# Patient Record
Sex: MALE | Race: BLACK OR AFRICAN AMERICAN | NOT HISPANIC OR LATINO | URBAN - METROPOLITAN AREA
[De-identification: names, ages, dates, MRNs, and addresses within clinical notes are randomized per-mention and may not be internally consistent; named-entity substitution may affect disease eponyms.]

---

## 2023-03-15 ENCOUNTER — INPATIENT (INPATIENT)
Facility: HOSPITAL | Age: 33
LOS: 4 days | Discharge: HOME HEALTH | End: 2023-03-20
Attending: INTERNAL MEDICINE | Admitting: INTERNAL MEDICINE
Payer: COMMERCIAL

## 2023-03-15 VITALS
WEIGHT: 250 LBS | TEMPERATURE: 98 F | DIASTOLIC BLOOD PRESSURE: 96 MMHG | RESPIRATION RATE: 20 BRPM | OXYGEN SATURATION: 96 % | HEIGHT: 71 IN | SYSTOLIC BLOOD PRESSURE: 134 MMHG | HEART RATE: 123 BPM

## 2023-03-15 DIAGNOSIS — E10.10 TYPE 1 DIABETES MELLITUS WITH KETOACIDOSIS WITHOUT COMA: ICD-10-CM

## 2023-03-15 LAB
ALBUMIN SERPL ELPH-MCNC: 3.7 G/DL — SIGNIFICANT CHANGE UP (ref 3.3–5)
ALBUMIN SERPL ELPH-MCNC: 3.9 G/DL — SIGNIFICANT CHANGE UP (ref 3.3–5)
ALBUMIN SERPL ELPH-MCNC: 4.8 G/DL — SIGNIFICANT CHANGE UP (ref 3.3–5)
ALP SERPL-CCNC: 111 U/L — SIGNIFICANT CHANGE UP (ref 40–120)
ALP SERPL-CCNC: 80 U/L — SIGNIFICANT CHANGE UP (ref 40–120)
ALP SERPL-CCNC: 89 U/L — SIGNIFICANT CHANGE UP (ref 40–120)
ALT FLD-CCNC: 47 U/L — SIGNIFICANT CHANGE UP (ref 12–78)
ALT FLD-CCNC: 57 U/L — SIGNIFICANT CHANGE UP (ref 12–78)
ALT FLD-CCNC: 68 U/L — SIGNIFICANT CHANGE UP (ref 12–78)
ANION GAP SERPL CALC-SCNC: 20 MMOL/L — HIGH (ref 5–17)
ANION GAP SERPL CALC-SCNC: 25 MMOL/L — HIGH (ref 5–17)
ANION GAP SERPL CALC-SCNC: 9 MMOL/L — SIGNIFICANT CHANGE UP (ref 5–17)
APPEARANCE UR: CLEAR — SIGNIFICANT CHANGE UP
APTT BLD: 29.5 SEC — SIGNIFICANT CHANGE UP (ref 27.5–35.5)
AST SERPL-CCNC: 11 U/L — LOW (ref 15–37)
AST SERPL-CCNC: 15 U/L — SIGNIFICANT CHANGE UP (ref 15–37)
AST SERPL-CCNC: 19 U/L — SIGNIFICANT CHANGE UP (ref 15–37)
BACTERIA # UR AUTO: ABNORMAL
BASE EXCESS BLDV CALC-SCNC: -15.5 MMOL/L — LOW (ref -2–3)
BASOPHILS # BLD AUTO: 0.03 K/UL — SIGNIFICANT CHANGE UP (ref 0–0.2)
BASOPHILS # BLD AUTO: 0.04 K/UL — SIGNIFICANT CHANGE UP (ref 0–0.2)
BASOPHILS NFR BLD AUTO: 0.3 % — SIGNIFICANT CHANGE UP (ref 0–2)
BASOPHILS NFR BLD AUTO: 0.4 % — SIGNIFICANT CHANGE UP (ref 0–2)
BILIRUB SERPL-MCNC: 0.5 MG/DL — SIGNIFICANT CHANGE UP (ref 0.2–1.2)
BILIRUB SERPL-MCNC: 0.6 MG/DL — SIGNIFICANT CHANGE UP (ref 0.2–1.2)
BILIRUB SERPL-MCNC: 0.6 MG/DL — SIGNIFICANT CHANGE UP (ref 0.2–1.2)
BILIRUB UR-MCNC: NEGATIVE — SIGNIFICANT CHANGE UP
BLOOD GAS COMMENTS, VENOUS: SIGNIFICANT CHANGE UP
BUN SERPL-MCNC: 18 MG/DL — SIGNIFICANT CHANGE UP (ref 7–23)
BUN SERPL-MCNC: 21 MG/DL — SIGNIFICANT CHANGE UP (ref 7–23)
BUN SERPL-MCNC: 24 MG/DL — HIGH (ref 7–23)
CALCIUM SERPL-MCNC: 9.1 MG/DL — SIGNIFICANT CHANGE UP (ref 8.5–10.1)
CALCIUM SERPL-MCNC: 9.2 MG/DL — SIGNIFICANT CHANGE UP (ref 8.5–10.1)
CALCIUM SERPL-MCNC: 9.8 MG/DL — SIGNIFICANT CHANGE UP (ref 8.5–10.1)
CHLORIDE BLDV-SCNC: 106 MMOL/L — SIGNIFICANT CHANGE UP (ref 98–107)
CHLORIDE SERPL-SCNC: 100 MMOL/L — SIGNIFICANT CHANGE UP (ref 96–108)
CHLORIDE SERPL-SCNC: 109 MMOL/L — HIGH (ref 96–108)
CHLORIDE SERPL-SCNC: 114 MMOL/L — HIGH (ref 96–108)
CO2 BLDV-SCNC: 12 MMOL/L — LOW (ref 22–26)
CO2 SERPL-SCNC: 11 MMOL/L — LOW (ref 22–31)
CO2 SERPL-SCNC: 19 MMOL/L — LOW (ref 22–31)
CO2 SERPL-SCNC: 9 MMOL/L — CRITICAL LOW (ref 22–31)
COLOR SPEC: YELLOW — SIGNIFICANT CHANGE UP
CREAT SERPL-MCNC: 1.56 MG/DL — HIGH (ref 0.5–1.3)
CREAT SERPL-MCNC: 1.77 MG/DL — HIGH (ref 0.5–1.3)
CREAT SERPL-MCNC: 2.11 MG/DL — HIGH (ref 0.5–1.3)
D DIMER BLD IA.RAPID-MCNC: 177 NG/ML DDU — SIGNIFICANT CHANGE UP
DIFF PNL FLD: ABNORMAL
EGFR: 42 ML/MIN/1.73M2 — LOW
EGFR: 52 ML/MIN/1.73M2 — LOW
EGFR: 61 ML/MIN/1.73M2 — SIGNIFICANT CHANGE UP
EOSINOPHIL # BLD AUTO: 0 K/UL — SIGNIFICANT CHANGE UP (ref 0–0.5)
EOSINOPHIL # BLD AUTO: 0 K/UL — SIGNIFICANT CHANGE UP (ref 0–0.5)
EOSINOPHIL NFR BLD AUTO: 0 % — SIGNIFICANT CHANGE UP (ref 0–6)
EOSINOPHIL NFR BLD AUTO: 0 % — SIGNIFICANT CHANGE UP (ref 0–6)
EPI CELLS # UR: SIGNIFICANT CHANGE UP
FLUAV AG NPH QL: SIGNIFICANT CHANGE UP
FLUBV AG NPH QL: SIGNIFICANT CHANGE UP
GAS PNL BLDA: SIGNIFICANT CHANGE UP
GAS PNL BLDV: 140 MMOL/L — SIGNIFICANT CHANGE UP (ref 136–145)
GAS PNL BLDV: SIGNIFICANT CHANGE UP
GAS PNL BLDV: SIGNIFICANT CHANGE UP
GLUCOSE BLDC GLUCOMTR-MCNC: 208 MG/DL — HIGH (ref 70–99)
GLUCOSE BLDC GLUCOMTR-MCNC: 225 MG/DL — HIGH (ref 70–99)
GLUCOSE BLDC GLUCOMTR-MCNC: 229 MG/DL — HIGH (ref 70–99)
GLUCOSE BLDC GLUCOMTR-MCNC: 243 MG/DL — HIGH (ref 70–99)
GLUCOSE BLDC GLUCOMTR-MCNC: 255 MG/DL — HIGH (ref 70–99)
GLUCOSE BLDC GLUCOMTR-MCNC: 273 MG/DL — HIGH (ref 70–99)
GLUCOSE BLDC GLUCOMTR-MCNC: 273 MG/DL — HIGH (ref 70–99)
GLUCOSE BLDC GLUCOMTR-MCNC: 334 MG/DL — HIGH (ref 70–99)
GLUCOSE BLDC GLUCOMTR-MCNC: 363 MG/DL — HIGH (ref 70–99)
GLUCOSE BLDC GLUCOMTR-MCNC: 509 MG/DL — CRITICAL HIGH (ref 70–99)
GLUCOSE BLDC GLUCOMTR-MCNC: 542 MG/DL — CRITICAL HIGH (ref 70–99)
GLUCOSE BLDC GLUCOMTR-MCNC: >600 MG/DL — CRITICAL HIGH (ref 70–99)
GLUCOSE BLDV-MCNC: 445 MG/DL — HIGH (ref 65–95)
GLUCOSE SERPL-MCNC: 269 MG/DL — HIGH (ref 70–99)
GLUCOSE SERPL-MCNC: 387 MG/DL — HIGH (ref 70–99)
GLUCOSE SERPL-MCNC: 759 MG/DL — CRITICAL HIGH (ref 70–99)
GLUCOSE UR QL: 1000 MG/DL
HCO3 BLDV-SCNC: 11 MMOL/L — LOW (ref 22–28)
HCT VFR BLD CALC: 43.5 % — SIGNIFICANT CHANGE UP (ref 39–50)
HCT VFR BLD CALC: 48.7 % — SIGNIFICANT CHANGE UP (ref 39–50)
HCT VFR BLD CALC: 52.2 % — HIGH (ref 39–50)
HCT VFR BLDA CALC: 52 % — HIGH (ref 37–47)
HGB BLD CALC-MCNC: 17.2 G/DL — SIGNIFICANT CHANGE UP (ref 12.6–17.4)
HGB BLD-MCNC: 14.9 G/DL — SIGNIFICANT CHANGE UP (ref 13–17)
HGB BLD-MCNC: 15.8 G/DL — SIGNIFICANT CHANGE UP (ref 13–17)
HGB BLD-MCNC: 17.4 G/DL — HIGH (ref 13–17)
HOROWITZ INDEX BLDV+IHG-RTO: 21 — SIGNIFICANT CHANGE UP
IMM GRANULOCYTES NFR BLD AUTO: 0.3 % — SIGNIFICANT CHANGE UP (ref 0–0.9)
IMM GRANULOCYTES NFR BLD AUTO: 0.4 % — SIGNIFICANT CHANGE UP (ref 0–0.9)
INR BLD: 0.96 RATIO — SIGNIFICANT CHANGE UP (ref 0.88–1.16)
KETONES UR-MCNC: ABNORMAL
LACTATE BLDV-MCNC: 3.5 MMOL/L — HIGH (ref 0.56–1.39)
LACTATE SERPL-SCNC: 1.7 MMOL/L — SIGNIFICANT CHANGE UP (ref 0.7–2)
LACTATE SERPL-SCNC: 2.4 MMOL/L — HIGH (ref 0.7–2)
LEUKOCYTE ESTERASE UR-ACNC: NEGATIVE — SIGNIFICANT CHANGE UP
LIDOCAIN IGE QN: 557 U/L — HIGH (ref 73–393)
LYMPHOCYTES # BLD AUTO: 1.96 K/UL — SIGNIFICANT CHANGE UP (ref 1–3.3)
LYMPHOCYTES # BLD AUTO: 21.8 % — SIGNIFICANT CHANGE UP (ref 13–44)
LYMPHOCYTES # BLD AUTO: 29.9 % — SIGNIFICANT CHANGE UP (ref 13–44)
LYMPHOCYTES # BLD AUTO: 3.35 K/UL — HIGH (ref 1–3.3)
MAGNESIUM SERPL-MCNC: 2.6 MG/DL — SIGNIFICANT CHANGE UP (ref 1.6–2.6)
MAGNESIUM SERPL-MCNC: 2.7 MG/DL — HIGH (ref 1.6–2.6)
MAGNESIUM SERPL-MCNC: 2.9 MG/DL — HIGH (ref 1.6–2.6)
MCHC RBC-ENTMCNC: 27.9 PG — SIGNIFICANT CHANGE UP (ref 27–34)
MCHC RBC-ENTMCNC: 28.2 PG — SIGNIFICANT CHANGE UP (ref 27–34)
MCHC RBC-ENTMCNC: 28.3 PG — SIGNIFICANT CHANGE UP (ref 27–34)
MCHC RBC-ENTMCNC: 32.4 G/DL — SIGNIFICANT CHANGE UP (ref 32–36)
MCHC RBC-ENTMCNC: 33.3 G/DL — SIGNIFICANT CHANGE UP (ref 32–36)
MCHC RBC-ENTMCNC: 34.3 G/DL — SIGNIFICANT CHANGE UP (ref 32–36)
MCV RBC AUTO: 82.5 FL — SIGNIFICANT CHANGE UP (ref 80–100)
MCV RBC AUTO: 84.5 FL — SIGNIFICANT CHANGE UP (ref 80–100)
MCV RBC AUTO: 85.9 FL — SIGNIFICANT CHANGE UP (ref 80–100)
MONOCYTES # BLD AUTO: 0.77 K/UL — SIGNIFICANT CHANGE UP (ref 0–0.9)
MONOCYTES # BLD AUTO: 1.17 K/UL — HIGH (ref 0–0.9)
MONOCYTES NFR BLD AUTO: 10.4 % — SIGNIFICANT CHANGE UP (ref 2–14)
MONOCYTES NFR BLD AUTO: 8.6 % — SIGNIFICANT CHANGE UP (ref 2–14)
NEUTROPHILS # BLD AUTO: 6.2 K/UL — SIGNIFICANT CHANGE UP (ref 1.8–7.4)
NEUTROPHILS # BLD AUTO: 6.6 K/UL — SIGNIFICANT CHANGE UP (ref 1.8–7.4)
NEUTROPHILS NFR BLD AUTO: 58.9 % — SIGNIFICANT CHANGE UP (ref 43–77)
NEUTROPHILS NFR BLD AUTO: 69 % — SIGNIFICANT CHANGE UP (ref 43–77)
NITRITE UR-MCNC: NEGATIVE — SIGNIFICANT CHANGE UP
NRBC # BLD: 0 /100 WBCS — SIGNIFICANT CHANGE UP (ref 0–0)
PCO2 BLDV: 28 MMHG — LOW (ref 42–55)
PH BLDV: 7.2 — LOW (ref 7.32–7.43)
PH UR: 6 — SIGNIFICANT CHANGE UP (ref 5–8)
PHOSPHATE SERPL-MCNC: 1.6 MG/DL — LOW (ref 2.5–4.5)
PHOSPHATE SERPL-MCNC: 2.2 MG/DL — LOW (ref 2.5–4.5)
PLATELET # BLD AUTO: 309 K/UL — SIGNIFICANT CHANGE UP (ref 150–400)
PLATELET # BLD AUTO: 333 K/UL — SIGNIFICANT CHANGE UP (ref 150–400)
PLATELET # BLD AUTO: 370 K/UL — SIGNIFICANT CHANGE UP (ref 150–400)
PO2 BLDV: 40 MMHG — SIGNIFICANT CHANGE UP (ref 25–45)
POTASSIUM BLDV-SCNC: 4.5 MMOL/L — SIGNIFICANT CHANGE UP (ref 3.5–5.1)
POTASSIUM SERPL-MCNC: 4 MMOL/L — SIGNIFICANT CHANGE UP (ref 3.5–5.3)
POTASSIUM SERPL-MCNC: 4.4 MMOL/L — SIGNIFICANT CHANGE UP (ref 3.5–5.3)
POTASSIUM SERPL-MCNC: 5.4 MMOL/L — HIGH (ref 3.5–5.3)
POTASSIUM SERPL-SCNC: 4 MMOL/L — SIGNIFICANT CHANGE UP (ref 3.5–5.3)
POTASSIUM SERPL-SCNC: 4.4 MMOL/L — SIGNIFICANT CHANGE UP (ref 3.5–5.3)
POTASSIUM SERPL-SCNC: 5.4 MMOL/L — HIGH (ref 3.5–5.3)
PROT SERPL-MCNC: 7.4 GM/DL — SIGNIFICANT CHANGE UP (ref 6–8.3)
PROT SERPL-MCNC: 8 GM/DL — SIGNIFICANT CHANGE UP (ref 6–8.3)
PROT SERPL-MCNC: 9.5 GM/DL — HIGH (ref 6–8.3)
PROT UR-MCNC: 100 MG/DL
PROTHROM AB SERPL-ACNC: 11.5 SEC — SIGNIFICANT CHANGE UP (ref 10.5–13.4)
RAPID RVP RESULT: SIGNIFICANT CHANGE UP
RBC # BLD: 5.27 M/UL — SIGNIFICANT CHANGE UP (ref 4.2–5.8)
RBC # BLD: 5.67 M/UL — SIGNIFICANT CHANGE UP (ref 4.2–5.8)
RBC # BLD: 6.18 M/UL — HIGH (ref 4.2–5.8)
RBC # FLD: 13.1 % — SIGNIFICANT CHANGE UP (ref 10.3–14.5)
RBC # FLD: 13.1 % — SIGNIFICANT CHANGE UP (ref 10.3–14.5)
RBC # FLD: 13.2 % — SIGNIFICANT CHANGE UP (ref 10.3–14.5)
RBC CASTS # UR COMP ASSIST: ABNORMAL /HPF (ref 0–4)
SAO2 % BLDV: 65.8 % — LOW (ref 94–98)
SARS-COV-2 RNA SPEC QL NAA+PROBE: SIGNIFICANT CHANGE UP
SARS-COV-2 RNA SPEC QL NAA+PROBE: SIGNIFICANT CHANGE UP
SODIUM SERPL-SCNC: 134 MMOL/L — LOW (ref 135–145)
SODIUM SERPL-SCNC: 140 MMOL/L — SIGNIFICANT CHANGE UP (ref 135–145)
SODIUM SERPL-SCNC: 142 MMOL/L — SIGNIFICANT CHANGE UP (ref 135–145)
SP GR SPEC: 1.01 — SIGNIFICANT CHANGE UP (ref 1.01–1.02)
TSH SERPL-MCNC: 1.12 UIU/ML — SIGNIFICANT CHANGE UP (ref 0.36–3.74)
UROBILINOGEN FLD QL: NEGATIVE MG/DL — SIGNIFICANT CHANGE UP
WBC # BLD: 11.02 K/UL — HIGH (ref 3.8–10.5)
WBC # BLD: 11.21 K/UL — HIGH (ref 3.8–10.5)
WBC # BLD: 8.99 K/UL — SIGNIFICANT CHANGE UP (ref 3.8–10.5)
WBC # FLD AUTO: 11.02 K/UL — HIGH (ref 3.8–10.5)
WBC # FLD AUTO: 11.21 K/UL — HIGH (ref 3.8–10.5)
WBC # FLD AUTO: 8.99 K/UL — SIGNIFICANT CHANGE UP (ref 3.8–10.5)
WBC UR QL: SIGNIFICANT CHANGE UP

## 2023-03-15 PROCEDURE — 93010 ELECTROCARDIOGRAM REPORT: CPT

## 2023-03-15 PROCEDURE — 99285 EMERGENCY DEPT VISIT HI MDM: CPT

## 2023-03-15 PROCEDURE — 99291 CRITICAL CARE FIRST HOUR: CPT

## 2023-03-15 PROCEDURE — 71046 X-RAY EXAM CHEST 2 VIEWS: CPT | Mod: 26

## 2023-03-15 RX ORDER — SODIUM CHLORIDE 9 MG/ML
1000 INJECTION, SOLUTION INTRAVENOUS ONCE
Refills: 0 | Status: COMPLETED | OUTPATIENT
Start: 2023-03-15 | End: 2023-03-15

## 2023-03-15 RX ORDER — INSULIN HUMAN 100 [IU]/ML
10 INJECTION, SOLUTION SUBCUTANEOUS
Qty: 100 | Refills: 0 | Status: DISCONTINUED | OUTPATIENT
Start: 2023-03-15 | End: 2023-03-16

## 2023-03-15 RX ORDER — SODIUM CHLORIDE 9 MG/ML
1000 INJECTION, SOLUTION INTRAVENOUS
Refills: 0 | Status: DISCONTINUED | OUTPATIENT
Start: 2023-03-15 | End: 2023-03-16

## 2023-03-15 RX ORDER — SODIUM,POTASSIUM PHOSPHATES 278-250MG
1 POWDER IN PACKET (EA) ORAL ONCE
Refills: 0 | Status: COMPLETED | OUTPATIENT
Start: 2023-03-15 | End: 2023-03-15

## 2023-03-15 RX ORDER — POTASSIUM PHOSPHATE, MONOBASIC POTASSIUM PHOSPHATE, DIBASIC 236; 224 MG/ML; MG/ML
15 INJECTION, SOLUTION INTRAVENOUS ONCE
Refills: 0 | Status: COMPLETED | OUTPATIENT
Start: 2023-03-15 | End: 2023-03-15

## 2023-03-15 RX ORDER — HEPARIN SODIUM 5000 [USP'U]/ML
5000 INJECTION INTRAVENOUS; SUBCUTANEOUS EVERY 8 HOURS
Refills: 0 | Status: DISCONTINUED | OUTPATIENT
Start: 2023-03-15 | End: 2023-03-17

## 2023-03-15 RX ORDER — ATORVASTATIN CALCIUM 80 MG/1
20 TABLET, FILM COATED ORAL AT BEDTIME
Refills: 0 | Status: DISCONTINUED | OUTPATIENT
Start: 2023-03-15 | End: 2023-03-20

## 2023-03-15 RX ORDER — ONDANSETRON 8 MG/1
4 TABLET, FILM COATED ORAL EVERY 6 HOURS
Refills: 0 | Status: DISCONTINUED | OUTPATIENT
Start: 2023-03-15 | End: 2023-03-20

## 2023-03-15 RX ORDER — ONDANSETRON 8 MG/1
4 TABLET, FILM COATED ORAL ONCE
Refills: 0 | Status: COMPLETED | OUTPATIENT
Start: 2023-03-15 | End: 2023-03-15

## 2023-03-15 RX ORDER — SODIUM CHLORIDE 9 MG/ML
1000 INJECTION INTRAMUSCULAR; INTRAVENOUS; SUBCUTANEOUS ONCE
Refills: 0 | Status: DISCONTINUED | OUTPATIENT
Start: 2023-03-15 | End: 2023-03-15

## 2023-03-15 RX ORDER — POTASSIUM PHOSPHATE, MONOBASIC POTASSIUM PHOSPHATE, DIBASIC 236; 224 MG/ML; MG/ML
15 INJECTION, SOLUTION INTRAVENOUS ONCE
Refills: 0 | Status: DISCONTINUED | OUTPATIENT
Start: 2023-03-15 | End: 2023-03-15

## 2023-03-15 RX ORDER — SODIUM CHLORIDE 9 MG/ML
1000 INJECTION, SOLUTION INTRAVENOUS
Refills: 0 | Status: DISCONTINUED | OUTPATIENT
Start: 2023-03-15 | End: 2023-03-15

## 2023-03-15 RX ORDER — INSULIN HUMAN 100 [IU]/ML
10 INJECTION, SOLUTION SUBCUTANEOUS ONCE
Refills: 0 | Status: COMPLETED | OUTPATIENT
Start: 2023-03-15 | End: 2023-03-15

## 2023-03-15 RX ORDER — CHLORHEXIDINE GLUCONATE 213 G/1000ML
1 SOLUTION TOPICAL
Refills: 0 | Status: DISCONTINUED | OUTPATIENT
Start: 2023-03-15 | End: 2023-03-17

## 2023-03-15 RX ADMIN — INSULIN HUMAN 10 UNIT(S): 100 INJECTION, SOLUTION SUBCUTANEOUS at 12:26

## 2023-03-15 RX ADMIN — SODIUM CHLORIDE 1000 MILLILITER(S): 9 INJECTION, SOLUTION INTRAVENOUS at 13:45

## 2023-03-15 RX ADMIN — INSULIN HUMAN 10 UNIT(S)/HR: 100 INJECTION, SOLUTION SUBCUTANEOUS at 14:22

## 2023-03-15 RX ADMIN — SODIUM CHLORIDE 1000 MILLILITER(S): 9 INJECTION, SOLUTION INTRAVENOUS at 13:44

## 2023-03-15 RX ADMIN — SODIUM CHLORIDE 150 MILLILITER(S): 9 INJECTION, SOLUTION INTRAVENOUS at 20:09

## 2023-03-15 RX ADMIN — INSULIN HUMAN 10 UNIT(S)/HR: 100 INJECTION, SOLUTION SUBCUTANEOUS at 20:04

## 2023-03-15 RX ADMIN — POTASSIUM PHOSPHATE, MONOBASIC POTASSIUM PHOSPHATE, DIBASIC 62.5 MILLIMOLE(S): 236; 224 INJECTION, SOLUTION INTRAVENOUS at 21:30

## 2023-03-15 RX ADMIN — ATORVASTATIN CALCIUM 20 MILLIGRAM(S): 80 TABLET, FILM COATED ORAL at 21:28

## 2023-03-15 RX ADMIN — SODIUM CHLORIDE 1000 MILLILITER(S): 9 INJECTION, SOLUTION INTRAVENOUS at 14:22

## 2023-03-15 RX ADMIN — SODIUM CHLORIDE 1000 MILLILITER(S): 9 INJECTION, SOLUTION INTRAVENOUS at 12:13

## 2023-03-15 RX ADMIN — SODIUM CHLORIDE 125 MILLILITER(S): 9 INJECTION, SOLUTION INTRAVENOUS at 15:14

## 2023-03-15 RX ADMIN — CHLORHEXIDINE GLUCONATE 1 APPLICATION(S): 213 SOLUTION TOPICAL at 14:50

## 2023-03-15 RX ADMIN — ONDANSETRON 4 MILLIGRAM(S): 8 TABLET, FILM COATED ORAL at 12:32

## 2023-03-15 RX ADMIN — HEPARIN SODIUM 5000 UNIT(S): 5000 INJECTION INTRAVENOUS; SUBCUTANEOUS at 21:28

## 2023-03-15 RX ADMIN — Medication 1 PACKET(S): at 23:57

## 2023-03-15 RX ADMIN — SODIUM CHLORIDE 1000 MILLILITER(S): 9 INJECTION, SOLUTION INTRAVENOUS at 16:07

## 2023-03-15 NOTE — ED ADULT NURSE REASSESSMENT NOTE - NS ED NURSE REASSESS COMMENT FT1
unable to gain IV access at this time, several attempts made to gain IV access with no success, MD made aware

## 2023-03-15 NOTE — ED PROVIDER NOTE - NS ED ROS FT
CONSTITUTIONAL: No fever, no chills.   EYES: No visual changes  ENT: No ear pain, no sore throat  CARDIOVASCULAR: No chest pain, no palpitations  RESPIRATORY: No cough.   GI:  no constipation, no diarrhea  GENITOURINARY: No dysuria, no frequency, no hematuria  MUSKULOSKELETAL: No backpain, no joint pain, no myalgias  SKIN: No rash  NEURO: No headache    ALL OTHER SYSTEMS NEGATIVE.

## 2023-03-15 NOTE — PROGRESS NOTE ADULT - ASSESSMENT
Pt is a 33 year old male with a pmhx of DM (on metformin), HLD presented with feeling fatigue, frequent urination, abdominal pain N/V, and difficulty breathing progressively getting worse for 1 month, found to have:    1. DKA  2. ARF       Plan  Maintain insulin infusion overnight currently at 4 units/hr. Serum bicarb still low (19) but improving. AG closed. Will await for AG closed x 2 before transition. q1hr accu-check. Start d5 + LR and increase to 150cc/hr given dehydration. q labs with lyts. Will give potassium phos and Po phos for lyts supplementation. transition to lantus when able. Endocrine consult.  NPO except ice chips and meds   monitor off abx, blood cx pending  SCDs + heparin for dvt ppx  Pt is a 33 year old male with a pmhx of DM (on metformin), HLD presented with feeling fatigue, frequent urination, abdominal pain N/V, and difficulty breathing progressively getting worse for 1 month, found to have:    1. DKA  2. ARF       Plan  Maintain insulin infusion overnight currently at 4 units/hr. Serum bicarb still low (19) but improving. AG closed. Will await for AG closed x 2 before transition to lantus. maintain q1hr accu-check. Start d5 + LR and increase to 150cc/hr given dehydration. q labs with lyts. Will give potassium phos and Po phos for lyts supplementation. transition to lantus when able. Endocrine consult.  NPO except ice chips and meds   monitor off abx, blood cx pending  SCDs + heparin for dvt ppx   zofran PRN  Remains in ICU   Case d/w Dr. Perera

## 2023-03-15 NOTE — H&P ADULT - NSHPPHYSICALEXAM_GEN_ALL_CORE
General: No acute distress.  Comfortable in bed  NEURO: A0x 4   HEENT: Pupils equal and symmetrically reactive to light. Dry mucous membranes   PULM: Clear to auscultation bilaterally.  CVS: Regular rhythm, no murmurs, rubs, or gallops. + tachycardia   ABD: Obese, soft, nondistended, no masses. Mild tenderness   EXT: No edema.  SKIN: Warm and well perfused, no rashes.

## 2023-03-15 NOTE — ED PROVIDER NOTE - ATTENDING APP SHARED VISIT CONTRIBUTION OF CARE
I have personally seen and examined this pt I have fully participated in the care of this pt I have made amendments to the documentation where appropriate and otherwise hx, exam and plan as documented by the ACP. Pt sts he has a hx of diabetes and takes Metformin 500 mg qd but did not take it for 1 to 2 weeks now pt is speaking in clear full sentences no nasal flaring no shoulders retractions appears very comfortable breath sounds are clear equal bilaterally abd is soft nontender to palp x 4 no cva tenderness.

## 2023-03-15 NOTE — ED PROVIDER NOTE - CLINICAL SUMMARY MEDICAL DECISION MAKING FREE TEXT BOX
32 y/o male with DM, HLD here with fatigue, frequent urination, right sided flank pain, vomiting x 1 month. Vs reviewed mildly tachycardic.   Will check labs including d-dimer,  UA r/o UTI, ekg, cxr, ivf. 32 y/o male with DM, HLD here with fatigue, frequent urination, right sided flank pain, vomiting x 1 month. Vs reviewed mildly tachycardic.   Will check labs including d-dimer,  UA r/o UTI, ekg, cxr, ivf.    labs reviewed and glucose >700s Anion gap 25. ph 7.23. IVF ordered, insulin 10 units IV ordered. D-dimer negative. cxr negative.   ICU consulted for DKA.   Discussed with ICU will admit to ICU under Dr Pettit

## 2023-03-15 NOTE — CONSULT NOTE ADULT - SUBJECTIVE AND OBJECTIVE BOX
Patient is a 33y old  Male who presents with a chief complaint of Nausea/Vomiting/Poor PO intake (15 Mar 2023 15:30)      Reason For Consult: Diabetic Ketoacidosis     HPI:  32 y/o male with DM (on metformin), HLD presented with feeling fatigue, frequent urination, abdominal pain N/V, and difficulty breathing progressively getting worse for 1 month. Pt states unable to tolerate PO with vomiting. Denies fever, chills, chest pain, diarrhea, hematuria. Denies recent travel (last travel to [facility] Sept), no sick contact. Pt reports not being able to take his DM medications for the past week. In ER labs notable for glucose 759, anion gap 25, bicarb 9, TASIA Cr 2.11, pH 7.23. (15 Mar 2023 15:30)    currently on IV insulin drip Diabetic Ketoacidosis resolved to a great extent with finger sticks in mid 200   switch made to Dextrose 5% water   PAST MEDICAL & SURGICAL HISTORY:  High cholesterol      Diabetes mellitus          FAMILY HISTORY:        Social History:    MEDICATIONS  (STANDING):  atorvastatin 20 milliGRAM(s) Oral at bedtime  chlorhexidine 2% Cloths 1 Application(s) Topical <User Schedule>  dextrose 5% + lactated ringers. 1000 milliLiter(s) (150 mL/Hr) IV Continuous <Continuous>  heparin   Injectable 5000 Unit(s) SubCutaneous every 8 hours  insulin regular Infusion 10 Unit(s)/Hr (10 mL/Hr) IV Continuous <Continuous>    MEDICATIONS  (PRN):  ondansetron Injectable 4 milliGRAM(s) IV Push every 6 hours PRN Nausea and/or Vomiting      REVIEW OF SYSTEMS:  CONSTITUTIONAL:  as per HPI      T(C): 35.9 (03-15-23 @ 14:50), Max: 36.6 (03-15-23 @ 08:30)  HR: 106 (03-15-23 @ 21:00) (106 - 123)  BP: 137/80 (03-15-23 @ 21:00) (122/83 - 158/89)  RR: 12 (03-15-23 @ 21:00) (10 - 26)  SpO2: 97% (03-15-23 @ 21:00) (95% - 100%)  Wt(kg): --    PHYSICAL EXAM:  GENERAL: NAD, well-groomed, well-developed  HEAD:  Atraumatic, Normocephalic  EYES: PERRLA, conjunctiva and sclera clear  ENMT: No  exudates,, Moist mucous membranes,, No lesions  NECK: Supple, No JVD,   NERVOUS SYSTEM:  Alert & Oriented   CHEST/LUNG: Clear to percussion bilaterally; No rales, rhonchi, wheezing, or rubs  HEART: Regular rate and rhythm; No murmurs, rubs, or gallops  ABDOMEN: Soft, Nontender, Nondistended; Bowel sounds present  EXTREMITIES:  2+ Peripheral Pulses, No clubbing, cyanosis, or edema  LYMPH: No lymphadenopathy noted  SKIN: No rashes or lesions    CAPILLARY BLOOD GLUCOSE      POCT Blood Glucose.: 208 mg/dL (15 Mar 2023 22:02)  POCT Blood Glucose.: 229 mg/dL (15 Mar 2023 20:56)  POCT Blood Glucose.: 243 mg/dL (15 Mar 2023 20:02)  POCT Blood Glucose.: 273 mg/dL (15 Mar 2023 19:00)  POCT Blood Glucose.: 273 mg/dL (15 Mar 2023 18:05)  POCT Blood Glucose.: 363 mg/dL (15 Mar 2023 17:07)  POCT Blood Glucose.: 334 mg/dL (15 Mar 2023 16:02)  POCT Blood Glucose.: 542 mg/dL (15 Mar 2023 15:04)  POCT Blood Glucose.: 509 mg/dL (15 Mar 2023 13:53)  POCT Blood Glucose.: >600 mg/dL (15 Mar 2023 11:44)                            15.8   11.21 )-----------( 333      ( 15 Mar 2023 16:36 )             48.7       CMP:  03-15 @ 16:36  SGPT 57  Albumin 3.9   Alk Phos 89   Anion Gap 20   SGOT 15   Total Bili 0.5   BUN 21   Calcium Total 9.1   CO2 11   Chloride 109   Creatinine 1.77   eGFR if AA --   eGFR if non AA --   Glucose 387   Potassium 4.4   Protein 8.0   Sodium 140      Thyroid Function Tests:  03-15 @ 16:36 TSH 1.120 FreeT4 -- T3 -- Anti TPO -- Anti Thyroglobulin Ab -- TSI --      Diabetes Tests:     Parathyroids:     Adrenals:       Radiology:

## 2023-03-15 NOTE — H&P ADULT - CRITICAL CARE ATTENDING COMMENT
34 y/o M w/T2DM presenting with DKA.    - IV fluids  - Insulin gtt  - Endo consult  - DVT prophylaxis 32 y/o M w/T2DM presenting with DKA. TASIA likely prerenal.    - IV fluids  - Insulin gtt  - Trend Cr, avoid nephrotoxins  - Endo consult  - DVT prophylaxis

## 2023-03-15 NOTE — H&P ADULT - NSHPLABSRESULTS_GEN_ALL_CORE
ICU Vital Signs Last 24 Hrs  T(C): 35.9 (15 Mar 2023 14:50), Max: 36.6 (15 Mar 2023 08:30)  T(F): 96.6 (15 Mar 2023 14:50), Max: 97.8 (15 Mar 2023 08:30)  HR: 118 (15 Mar 2023 15:30) (114 - 123)  BP: 148/97 (15 Mar 2023 15:00) (134/96 - 153/98)  BP(mean): 106 (15 Mar 2023 15:00) (106 - 108)  ABP: --  ABP(mean): --  RR: 18 (15 Mar 2023 15:30) (17 - 20)  SpO2: 100% (15 Mar 2023 15:30) (95% - 100%)    O2 Parameters below as of 15 Mar 2023 11:02  Patient On (Oxygen Delivery Method): room air        MEDICATIONS  (STANDING):  atorvastatin 20 milliGRAM(s) Oral at bedtime  chlorhexidine 2% Cloths 1 Application(s) Topical <User Schedule>  heparin   Injectable 5000 Unit(s) SubCutaneous every 8 hours  insulin regular Infusion 10 Unit(s)/Hr (10 mL/Hr) IV Continuous <Continuous>  lactated ringers. 1000 milliLiter(s) (125 mL/Hr) IV Continuous <Continuous>    MEDICATIONS  (PRN):  ondansetron Injectable 4 milliGRAM(s) IV Push every 6 hours PRN Nausea and/or Vomiting      LABS:                          17.4   8.99  )-----------( 370      ( 15 Mar 2023 11:00 )             52.2     03-15    134<L>  |  100  |  24<H>  ----------------------------<  759<HH>  5.4<H>   |  9<LL>  |  2.11<H>    Ca    9.8      15 Mar 2023 11:00  Mg     2.9     03-15    TPro  9.5<H>  /  Alb  4.8  /  TBili  0.6  /  DBili  x   /  AST  19  /  ALT  68  /  AlkPhos  111  03-15    LIVER FUNCTIONS - ( 15 Mar 2023 11:00 )  Alb: 4.8 g/dL / Pro: 9.5 gm/dL / ALK PHOS: 111 U/L / ALT: 68 U/L / AST: 19 U/L / GGT: x         thy  PT/INR - ( 15 Mar 2023 11:00 )   PT: 11.5 sec;   INR: 0.96 ratio         PTT - ( 15 Mar 2023 11:00 )  PTT:29.5 sec  Urinalysis Basic - ( 15 Mar 2023 09:49 )    Color: Yellow / Appearance: Clear / S.015 / pH: x  Gluc: x / Ketone: Large  / Bili: Negative / Urobili: Negative mg/dL   Blood: x / Protein: 100 mg/dL / Nitrite: Negative   Leuk Esterase: Negative / RBC: 3-5 /HPF / WBC 3-5   Sq Epi: x / Non Sq Epi: Occasional / Bacteria: Few      D-Dimer Assay, Quantitative: 177 ng/mL DDU (03-15-23 @ 11:00)    RVP: negative   CXR: clear

## 2023-03-15 NOTE — ED ADULT TRIAGE NOTE - CHIEF COMPLAINT QUOTE
Pt biba with c/o frequent urination, vomiting, R side flank pain, and fatigue x 1 month. h/o HLD, DM

## 2023-03-15 NOTE — CONSULT NOTE ADULT - PROBLEM SELECTOR RECOMMENDATION 9
Continue with the current  regimen while inpatient   later change to Lantus and prandial lispro   while inpatient, finger sticks should be 100-180   Thank You for the courtesy of this consultation !!!

## 2023-03-15 NOTE — ED ADULT NURSE NOTE - OBJECTIVE STATEMENT
as per pt, "for the past month I feel sick, weak, pain all over" pt appears lethargic, non labored breathing on room air

## 2023-03-15 NOTE — ED PROVIDER NOTE - CARE PLAN
Principal Discharge DX:	Generalized weakness   1 Principal Discharge DX:	DKA (diabetic ketoacidosis)

## 2023-03-15 NOTE — PROGRESS NOTE ADULT - SUBJECTIVE AND OBJECTIVE BOX
Patient is a 33y old  Male who presents with a chief complaint of Nausea/Vomiting/Poor PO intake (15 Mar 2023 22:03)      BRIEF HOSPITAL COURSE:   Pt is a 33 year old male with a pmhx of DM (on metformin), HLD presented with feeling fatigue, frequent urination, abdominal pain N/V, and difficulty breathing progressively getting worse for 1 month. Of note pt noted to not taking his DM medication for the past week as he was losing weight. Upon arrival to the ED he was found to be in DKA with ARF. ph of 7.23. Started on IV insulin and IVF. Admitted to MICU for such       Events last 24 hours:   Pt with improving AG and serum bicarb but not normalized as of yet  will maintain on insulin infusion overnight  Replace potassium with IV and PO       PAST MEDICAL & SURGICAL HISTORY:  High cholesterol      Diabetes mellitus        Allergies    No Known Allergies    Intolerances      FAMILY HISTORY:      Review of Systems:  CONSTITUTIONAL: No fever, chills, or fatigue  EYES: No eye pain, visual disturbances, or discharge  ENMT:  No difficulty hearing, tinnitus, vertigo; No sinus or throat pain  NECK: No pain or stiffness  RESPIRATORY: No cough, wheezing, chills or hemoptysis; No shortness of breath  CARDIOVASCULAR: No chest pain, palpitations, dizziness, or leg swelling  GASTROINTESTINAL: No abdominal or epigastric pain. No nausea, vomiting, or hematemesis; No diarrhea or constipation. No melena or hematochezia.  GENITOURINARY: No dysuria, frequency, hematuria, or incontinence  NEUROLOGICAL: No headaches, memory loss, loss of strength, numbness, or tremors  SKIN: No itching, burning, rashes, or lesions   MUSCULOSKELETAL: No joint pain or swelling; No muscle, back, or extremity pain  PSYCHIATRIC: No depression, anxiety, mood swings, or difficulty sleeping      Medications:        ondansetron Injectable 4 milliGRAM(s) IV Push every 6 hours PRN      heparin   Injectable 5000 Unit(s) SubCutaneous every 8 hours        atorvastatin 20 milliGRAM(s) Oral at bedtime  insulin regular Infusion 10 Unit(s)/Hr IV Continuous <Continuous>    dextrose 5% + lactated ringers. 1000 milliLiter(s) IV Continuous <Continuous>  potassium phosphate / sodium phosphate Powder (PHOS-NaK) 1 Packet(s) Oral once      chlorhexidine 2% Cloths 1 Application(s) Topical <User Schedule>            ICU Vital Signs Last 24 Hrs  T(C): 35.7 (15 Mar 2023 21:00), Max: 36.6 (15 Mar 2023 08:30)  T(F): 96.2 (15 Mar 2023 21:00), Max: 97.8 (15 Mar 2023 08:30)  HR: 110 (15 Mar 2023 22:00) (106 - 123)  BP: 119/81 (15 Mar 2023 22:00) (119/81 - 158/89)  BP(mean): 91 (15 Mar 2023 22:00) (84 - 108)  ABP: --  ABP(mean): --  RR: 11 (15 Mar 2023 22:00) (10 - 26)  SpO2: 97% (15 Mar 2023 22:00) (95% - 100%)    O2 Parameters below as of 15 Mar 2023 20:00  Patient On (Oxygen Delivery Method): room air          Vital Signs Last 24 Hrs  T(C): 35.7 (15 Mar 2023 21:00), Max: 36.6 (15 Mar 2023 08:30)  T(F): 96.2 (15 Mar 2023 21:00), Max: 97.8 (15 Mar 2023 08:30)  HR: 110 (15 Mar 2023 22:00) (106 - 123)  BP: 119/81 (15 Mar 2023 22:00) (119/81 - 158/89)  BP(mean): 91 (15 Mar 2023 22:00) (84 - 108)  RR: 11 (15 Mar 2023 22:00) (10 - 26)  SpO2: 97% (15 Mar 2023 22:00) (95% - 100%)    Parameters below as of 15 Mar 2023 20:00  Patient On (Oxygen Delivery Method): room air        ABG - ( 15 Mar 2023 12:36 )  pH, Arterial: 7.23  pH, Blood: x     /  pCO2: <15   /  pO2: 121   / HCO3: incalculable / Base Excess: 0.0   /  SaO2: 99.0                I&O's Detail    15 Mar 2023 07:01  -  15 Mar 2023 22:54  --------------------------------------------------------  IN:    dextrose 5% + lactated ringers: 300 mL    Insulin: 50 mL    Lactated Ringers: 500 mL    Lactated Ringers Bolus: 2000 mL  Total IN: 2850 mL    OUT:    Voided (mL): 625 mL  Total OUT: 625 mL    Total NET: 2225 mL            LABS:                        14.9   11.02 )-----------( 309      ( 15 Mar 2023 22:00 )             43.5     03-15    142  |  114<H>  |  18  ----------------------------<  269<H>  4.0   |  19<L>  |  1.56<H>    Ca    9.2      15 Mar 2023 22:00  Phos  1.6     -15  Mg     2.6     03-15    TPro  7.4  /  Alb  3.7  /  TBili  0.6  /  DBili  x   /  AST  11<L>  /  ALT  47  /  AlkPhos  80  03-15          CAPILLARY BLOOD GLUCOSE      POCT Blood Glucose.: 208 mg/dL (15 Mar 2023 22:02)    PT/INR - ( 15 Mar 2023 11:00 )   PT: 11.5 sec;   INR: 0.96 ratio         PTT - ( 15 Mar 2023 11:00 )  PTT:29.5 sec  Urinalysis Basic - ( 15 Mar 2023 09:49 )    Color: Yellow / Appearance: Clear / S.015 / pH: x  Gluc: x / Ketone: Large  / Bili: Negative / Urobili: Negative mg/dL   Blood: x / Protein: 100 mg/dL / Nitrite: Negative   Leuk Esterase: Negative / RBC: 3-5 /HPF / WBC 3-5   Sq Epi: x / Non Sq Epi: Occasional / Bacteria: Few      CULTURES:      Physical Examination:    General: Adult male in bed, NAD    HEENT: Pupils equal, reactive to light.  Symmetric. dry mucous membranes     PULM: Clear to auscultation bilaterally, no significant sputum production    CVS: Regular rate and rhythm, no murmurs, rubs, or gallops    ABD: Soft, nondistended, nontender, normoactive bowel sounds, no masses    EXT: No edema, nontender    SKIN: Warm     Neuro: awake alert     RADIOLOGY:   < from: Xray Chest 2 Views PA/Lat (03.15.23 @ 11:09) >    INTERPRETATION:  Clinical information: Dyspnea.    TECHNIQUE: PA and lateral views of the chest.    COMPARISON: None available.    FINDINGS: The lungs are clear. The cardiomediastinal silhouette is   normal. The visualized osseous structures are unremarkable.    IMPRESSION: Clear lungs.

## 2023-03-15 NOTE — H&P ADULT - ASSESSMENT
Assessment: 34 y/o male with DM (on metformin), HLD presented with feeling fatigue, frequent urination, abdominal pain N/V, and difficulty breathing progressively getting worse for 1 month. Pt states unable to tolerate PO with vomiting. Denies fever, chills, chest pain, diarrhea, hematuria. Denies recent travel (last travel to [facility] Sept), no sick contact. Pt reports not being able to take his DM medications for the past week. In ER labs notable for glucose 759, anion gap 25, bicarb 9, TASIA Cr 2.11, pH 7.23. Admitted to ICU for DKA management       Plan:   -Neuro: Drowsy but oriented x3; monitor for possible metabolic encephalopathy secondary to DKA; continue to monitor mental status.  -Resp: Ventilating will on room air, protecting airway, CXR clear.   -CV: Hemodynamically stable, maintain MAP>65.   -GI: NPO for now. Will check lipase.  Abdominal pain RUQ mild, pending RUQ US. PRN zofran for nausea/vomiting.   -Renal: TASIA (Cr 2.12) likely secondary to dehydration in the setting of DKA; continue hydration as needed, avoid nephrotoxic agents, trend BUN/Cr. s/p 4L on maintaince IVF for hypovolemia.   -Endo: Pt with initial serum glucose 759 with AG 25, s/p 10u regular insulin bolus in ED, insulin gtt initiated. Monitor FS q1h and titrate insulin gtt as needed. Monitor serial BMPs, replete electrolytes. Elevated K to 5.4 but should normalize s/p insulin- will trend serial BMPS. S/p 4 liters IVF resuscitation in ED, continue maintenance IV hydration as needed. f/u a1c, beta-hydrox, and tsh levels. Endocrinology cs    -ID: Covid negative x1. f/u full RVP. No leukocytosis, afebrile in ED. . Blood and urine cx pending, lactate 2.4, f/u repeat lactate & procal. monitor off abx for now. .  -Heme: HSC for DVT ppx  -Dispo: Admit to ICU for DKA management plan of care discussed with ICU attending, Hodan CHA.

## 2023-03-15 NOTE — ED ADULT NURSE NOTE - NSIMPLEMENTINTERV_GEN_ALL_ED
Implemented All Universal Safety Interventions:  [place] to call system. Call bell, personal items and telephone within reach. Instruct patient to call for assistance. Room bathroom lighting operational. Non-slip footwear when patient is off stretcher. Physically safe environment: no spills, clutter or unnecessary equipment. Stretcher in lowest position, wheels locked, appropriate side rails in place.

## 2023-03-15 NOTE — ED ADULT NURSE NOTE - NS ED NURSE RECORD ANOTHER HT AND WT
FAMILY HISTORY:  Mother  Still living? No  Family history of emphysema, Age at diagnosis: Age Unknown  Family history of heart attack, Age at diagnosis: Age Unknown    Sibling  Still living? Yes, Estimated age: 59  Family history of type 2 diabetes mellitus in brother, Age at diagnosis: Age Unknown    
Yes

## 2023-03-15 NOTE — ED PROVIDER NOTE - OBJECTIVE STATEMENT
34 y/o male with DM, HLD here with feeling fatigue, frequent urination, and difficulty breathing x  1 month. Pt reports also having right flank pain that started today and vomiting for the last few days. Pt states unable to tolerate PO. Denies fever, chills, chest pain, diarrhea, hematuria. Denies recent travel, sick contact. 34 y/o male with DM, HLD here with feeling fatigue, frequent urination, and difficulty breathing x  1 month. Pt reports also having right flank pain that started today and vomiting for the last few days. Pt states unable to tolerate PO. Denies fever, chills, chest pain, diarrhea, hematuria. Denies recent travel, sick contact. Pt reports not taking his DM medications for the past week.

## 2023-03-15 NOTE — PATIENT PROFILE ADULT - FALL HARM RISK - RISK INTERVENTIONS

## 2023-03-15 NOTE — H&P ADULT - HISTORY OF PRESENT ILLNESS
32 y/o male with DM (on metformin), HLD presented with feeling fatigue, frequent urination, abdominal pain N/V, and difficulty breathing progressively getting worse for 1 month. Pt states unable to tolerate PO with vomiting. Denies fever, chills, chest pain, diarrhea, hematuria. Denies recent travel (last travel to [facility] Sept), no sick contact. Pt reports not being able to take his DM medications for the past week. In ER labs notable for glucose 759, anion gap 25, bicarb 9, TASIA Cr 2.11, pH 7.23.

## 2023-03-16 LAB
A1C WITH ESTIMATED AVERAGE GLUCOSE RESULT: >15.5 % — HIGH (ref 4–5.6)
ALBUMIN SERPL ELPH-MCNC: 3.3 G/DL — SIGNIFICANT CHANGE UP (ref 3.3–5)
ALBUMIN SERPL ELPH-MCNC: 3.8 G/DL — SIGNIFICANT CHANGE UP (ref 3.3–5)
ALP SERPL-CCNC: 69 U/L — SIGNIFICANT CHANGE UP (ref 40–120)
ALP SERPL-CCNC: 77 U/L — SIGNIFICANT CHANGE UP (ref 40–120)
ALT FLD-CCNC: 43 U/L — SIGNIFICANT CHANGE UP (ref 12–78)
ALT FLD-CCNC: 46 U/L — SIGNIFICANT CHANGE UP (ref 12–78)
ANION GAP SERPL CALC-SCNC: 5 MMOL/L — SIGNIFICANT CHANGE UP (ref 5–17)
ANION GAP SERPL CALC-SCNC: 8 MMOL/L — SIGNIFICANT CHANGE UP (ref 5–17)
AST SERPL-CCNC: 17 U/L — SIGNIFICANT CHANGE UP (ref 15–37)
AST SERPL-CCNC: 17 U/L — SIGNIFICANT CHANGE UP (ref 15–37)
B-OH-BUTYR SERPL-SCNC: 9.5 MMOL/L — HIGH
BASOPHILS # BLD AUTO: 0.03 K/UL — SIGNIFICANT CHANGE UP (ref 0–0.2)
BASOPHILS NFR BLD AUTO: 0.4 % — SIGNIFICANT CHANGE UP (ref 0–2)
BILIRUB SERPL-MCNC: 0.5 MG/DL — SIGNIFICANT CHANGE UP (ref 0.2–1.2)
BILIRUB SERPL-MCNC: 0.5 MG/DL — SIGNIFICANT CHANGE UP (ref 0.2–1.2)
BUN SERPL-MCNC: 16 MG/DL — SIGNIFICANT CHANGE UP (ref 7–23)
BUN SERPL-MCNC: 17 MG/DL — SIGNIFICANT CHANGE UP (ref 7–23)
CALCIUM SERPL-MCNC: 9.1 MG/DL — SIGNIFICANT CHANGE UP (ref 8.5–10.1)
CALCIUM SERPL-MCNC: 9.2 MG/DL — SIGNIFICANT CHANGE UP (ref 8.5–10.1)
CHLORIDE SERPL-SCNC: 106 MMOL/L — SIGNIFICANT CHANGE UP (ref 96–108)
CHLORIDE SERPL-SCNC: 114 MMOL/L — HIGH (ref 96–108)
CO2 SERPL-SCNC: 23 MMOL/L — SIGNIFICANT CHANGE UP (ref 22–31)
CO2 SERPL-SCNC: 24 MMOL/L — SIGNIFICANT CHANGE UP (ref 22–31)
CREAT SERPL-MCNC: 1.3 MG/DL — SIGNIFICANT CHANGE UP (ref 0.5–1.3)
CREAT SERPL-MCNC: 1.46 MG/DL — HIGH (ref 0.5–1.3)
EGFR: 66 ML/MIN/1.73M2 — SIGNIFICANT CHANGE UP
EGFR: 75 ML/MIN/1.73M2 — SIGNIFICANT CHANGE UP
EOSINOPHIL # BLD AUTO: 0.01 K/UL — SIGNIFICANT CHANGE UP (ref 0–0.5)
EOSINOPHIL NFR BLD AUTO: 0.1 % — SIGNIFICANT CHANGE UP (ref 0–6)
ESTIMATED AVERAGE GLUCOSE: >398 MG/DL — HIGH (ref 68–114)
GLUCOSE BLDC GLUCOMTR-MCNC: 235 MG/DL — HIGH (ref 70–99)
GLUCOSE BLDC GLUCOMTR-MCNC: 243 MG/DL — HIGH (ref 70–99)
GLUCOSE BLDC GLUCOMTR-MCNC: 247 MG/DL — HIGH (ref 70–99)
GLUCOSE BLDC GLUCOMTR-MCNC: 252 MG/DL — HIGH (ref 70–99)
GLUCOSE BLDC GLUCOMTR-MCNC: 259 MG/DL — HIGH (ref 70–99)
GLUCOSE BLDC GLUCOMTR-MCNC: 262 MG/DL — HIGH (ref 70–99)
GLUCOSE BLDC GLUCOMTR-MCNC: 264 MG/DL — HIGH (ref 70–99)
GLUCOSE BLDC GLUCOMTR-MCNC: 273 MG/DL — HIGH (ref 70–99)
GLUCOSE BLDC GLUCOMTR-MCNC: 315 MG/DL — HIGH (ref 70–99)
GLUCOSE BLDC GLUCOMTR-MCNC: 348 MG/DL — HIGH (ref 70–99)
GLUCOSE BLDC GLUCOMTR-MCNC: 369 MG/DL — HIGH (ref 70–99)
GLUCOSE BLDC GLUCOMTR-MCNC: 384 MG/DL — HIGH (ref 70–99)
GLUCOSE BLDC GLUCOMTR-MCNC: 413 MG/DL — HIGH (ref 70–99)
GLUCOSE SERPL-MCNC: 303 MG/DL — HIGH (ref 70–99)
GLUCOSE SERPL-MCNC: 332 MG/DL — HIGH (ref 70–99)
HCT VFR BLD CALC: 43.7 % — SIGNIFICANT CHANGE UP (ref 39–50)
HGB BLD-MCNC: 15.1 G/DL — SIGNIFICANT CHANGE UP (ref 13–17)
IMM GRANULOCYTES NFR BLD AUTO: 0.4 % — SIGNIFICANT CHANGE UP (ref 0–0.9)
LIDOCAIN IGE QN: 435 U/L — HIGH (ref 73–393)
LYMPHOCYTES # BLD AUTO: 2.73 K/UL — SIGNIFICANT CHANGE UP (ref 1–3.3)
LYMPHOCYTES # BLD AUTO: 35 % — SIGNIFICANT CHANGE UP (ref 13–44)
MAGNESIUM SERPL-MCNC: 2.6 MG/DL — SIGNIFICANT CHANGE UP (ref 1.6–2.6)
MAGNESIUM SERPL-MCNC: 2.7 MG/DL — HIGH (ref 1.6–2.6)
MCHC RBC-ENTMCNC: 28.2 PG — SIGNIFICANT CHANGE UP (ref 27–34)
MCHC RBC-ENTMCNC: 34.6 G/DL — SIGNIFICANT CHANGE UP (ref 32–36)
MCV RBC AUTO: 81.7 FL — SIGNIFICANT CHANGE UP (ref 80–100)
MONOCYTES # BLD AUTO: 1.03 K/UL — HIGH (ref 0–0.9)
MONOCYTES NFR BLD AUTO: 13.2 % — SIGNIFICANT CHANGE UP (ref 2–14)
NEUTROPHILS # BLD AUTO: 3.98 K/UL — SIGNIFICANT CHANGE UP (ref 1.8–7.4)
NEUTROPHILS NFR BLD AUTO: 50.9 % — SIGNIFICANT CHANGE UP (ref 43–77)
NRBC # BLD: 0 /100 WBCS — SIGNIFICANT CHANGE UP (ref 0–0)
PHOSPHATE SERPL-MCNC: 2.3 MG/DL — LOW (ref 2.5–4.5)
PHOSPHATE SERPL-MCNC: 2.5 MG/DL — SIGNIFICANT CHANGE UP (ref 2.5–4.5)
PLATELET # BLD AUTO: 301 K/UL — SIGNIFICANT CHANGE UP (ref 150–400)
POTASSIUM SERPL-MCNC: 3.5 MMOL/L — SIGNIFICANT CHANGE UP (ref 3.5–5.3)
POTASSIUM SERPL-MCNC: 3.9 MMOL/L — SIGNIFICANT CHANGE UP (ref 3.5–5.3)
POTASSIUM SERPL-SCNC: 3.5 MMOL/L — SIGNIFICANT CHANGE UP (ref 3.5–5.3)
POTASSIUM SERPL-SCNC: 3.9 MMOL/L — SIGNIFICANT CHANGE UP (ref 3.5–5.3)
PROCALCITONIN SERPL-MCNC: 0.1 NG/ML — SIGNIFICANT CHANGE UP (ref 0.02–0.1)
PROT SERPL-MCNC: 6.7 GM/DL — SIGNIFICANT CHANGE UP (ref 6–8.3)
PROT SERPL-MCNC: 7.2 GM/DL — SIGNIFICANT CHANGE UP (ref 6–8.3)
RBC # BLD: 5.35 M/UL — SIGNIFICANT CHANGE UP (ref 4.2–5.8)
RBC # FLD: 13.2 % — SIGNIFICANT CHANGE UP (ref 10.3–14.5)
SODIUM SERPL-SCNC: 138 MMOL/L — SIGNIFICANT CHANGE UP (ref 135–145)
SODIUM SERPL-SCNC: 142 MMOL/L — SIGNIFICANT CHANGE UP (ref 135–145)
WBC # BLD: 7.81 K/UL — SIGNIFICANT CHANGE UP (ref 3.8–10.5)
WBC # FLD AUTO: 7.81 K/UL — SIGNIFICANT CHANGE UP (ref 3.8–10.5)

## 2023-03-16 PROCEDURE — 76705 ECHO EXAM OF ABDOMEN: CPT | Mod: 26

## 2023-03-16 PROCEDURE — 99232 SBSQ HOSP IP/OBS MODERATE 35: CPT

## 2023-03-16 RX ORDER — SODIUM,POTASSIUM PHOSPHATES 278-250MG
2 POWDER IN PACKET (EA) ORAL ONCE
Refills: 0 | Status: COMPLETED | OUTPATIENT
Start: 2023-03-16 | End: 2023-03-16

## 2023-03-16 RX ORDER — INSULIN GLARGINE 100 [IU]/ML
20 INJECTION, SOLUTION SUBCUTANEOUS ONCE
Refills: 0 | Status: COMPLETED | OUTPATIENT
Start: 2023-03-16 | End: 2023-03-16

## 2023-03-16 RX ORDER — INSULIN GLARGINE 100 [IU]/ML
15 INJECTION, SOLUTION SUBCUTANEOUS ONCE
Refills: 0 | Status: COMPLETED | OUTPATIENT
Start: 2023-03-16 | End: 2023-03-16

## 2023-03-16 RX ORDER — INSULIN LISPRO 100/ML
10 VIAL (ML) SUBCUTANEOUS
Refills: 0 | Status: DISCONTINUED | OUTPATIENT
Start: 2023-03-16 | End: 2023-03-16

## 2023-03-16 RX ORDER — POTASSIUM CHLORIDE 20 MEQ
40 PACKET (EA) ORAL ONCE
Refills: 0 | Status: COMPLETED | OUTPATIENT
Start: 2023-03-16 | End: 2023-03-16

## 2023-03-16 RX ORDER — INSULIN LISPRO 100/ML
8 VIAL (ML) SUBCUTANEOUS ONCE
Refills: 0 | Status: COMPLETED | OUTPATIENT
Start: 2023-03-16 | End: 2023-03-16

## 2023-03-16 RX ORDER — INSULIN GLARGINE 100 [IU]/ML
25 INJECTION, SOLUTION SUBCUTANEOUS
Refills: 0 | Status: DISCONTINUED | OUTPATIENT
Start: 2023-03-16 | End: 2023-03-18

## 2023-03-16 RX ORDER — INSULIN HUMAN 100 [IU]/ML
5 INJECTION, SOLUTION SUBCUTANEOUS ONCE
Refills: 0 | Status: COMPLETED | OUTPATIENT
Start: 2023-03-16 | End: 2023-03-16

## 2023-03-16 RX ORDER — INSULIN LISPRO 100/ML
VIAL (ML) SUBCUTANEOUS
Refills: 0 | Status: DISCONTINUED | OUTPATIENT
Start: 2023-03-16 | End: 2023-03-20

## 2023-03-16 RX ORDER — INSULIN LISPRO 100/ML
7 VIAL (ML) SUBCUTANEOUS
Refills: 0 | Status: DISCONTINUED | OUTPATIENT
Start: 2023-03-16 | End: 2023-03-16

## 2023-03-16 RX ORDER — INSULIN LISPRO 100/ML
12 VIAL (ML) SUBCUTANEOUS
Refills: 0 | Status: DISCONTINUED | OUTPATIENT
Start: 2023-03-16 | End: 2023-03-20

## 2023-03-16 RX ADMIN — Medication 8: at 16:28

## 2023-03-16 RX ADMIN — HEPARIN SODIUM 5000 UNIT(S): 5000 INJECTION INTRAVENOUS; SUBCUTANEOUS at 21:08

## 2023-03-16 RX ADMIN — HEPARIN SODIUM 5000 UNIT(S): 5000 INJECTION INTRAVENOUS; SUBCUTANEOUS at 14:30

## 2023-03-16 RX ADMIN — Medication 10: at 21:08

## 2023-03-16 RX ADMIN — Medication 7 UNIT(S): at 11:36

## 2023-03-16 RX ADMIN — Medication 7 UNIT(S): at 09:01

## 2023-03-16 RX ADMIN — INSULIN HUMAN 10 UNIT(S)/HR: 100 INJECTION, SOLUTION SUBCUTANEOUS at 05:25

## 2023-03-16 RX ADMIN — Medication 8 UNIT(S): at 09:01

## 2023-03-16 RX ADMIN — Medication 40 MILLIEQUIVALENT(S): at 12:21

## 2023-03-16 RX ADMIN — HEPARIN SODIUM 5000 UNIT(S): 5000 INJECTION INTRAVENOUS; SUBCUTANEOUS at 05:25

## 2023-03-16 RX ADMIN — SODIUM CHLORIDE 100 MILLILITER(S): 9 INJECTION, SOLUTION INTRAVENOUS at 04:00

## 2023-03-16 RX ADMIN — CHLORHEXIDINE GLUCONATE 1 APPLICATION(S): 213 SOLUTION TOPICAL at 06:30

## 2023-03-16 RX ADMIN — Medication 10 UNIT(S): at 16:28

## 2023-03-16 RX ADMIN — Medication 12: at 11:36

## 2023-03-16 RX ADMIN — SODIUM CHLORIDE 150 MILLILITER(S): 9 INJECTION, SOLUTION INTRAVENOUS at 03:02

## 2023-03-16 RX ADMIN — INSULIN HUMAN 5 UNIT(S): 100 INJECTION, SOLUTION SUBCUTANEOUS at 12:20

## 2023-03-16 RX ADMIN — INSULIN GLARGINE 25 UNIT(S): 100 INJECTION, SOLUTION SUBCUTANEOUS at 23:57

## 2023-03-16 RX ADMIN — Medication 40 MILLIEQUIVALENT(S): at 22:37

## 2023-03-16 RX ADMIN — INSULIN GLARGINE 15 UNIT(S): 100 INJECTION, SOLUTION SUBCUTANEOUS at 14:16

## 2023-03-16 RX ADMIN — INSULIN GLARGINE 20 UNIT(S): 100 INJECTION, SOLUTION SUBCUTANEOUS at 08:49

## 2023-03-16 RX ADMIN — Medication 2 PACKET(S): at 06:07

## 2023-03-16 RX ADMIN — ATORVASTATIN CALCIUM 20 MILLIGRAM(S): 80 TABLET, FILM COATED ORAL at 21:08

## 2023-03-16 NOTE — PROGRESS NOTE ADULT - SUBJECTIVE AND OBJECTIVE BOX
Patient is a 33y old  Male who presents with a chief complaint of Nausea/Vomiting/Poor PO intake (16 Mar 2023 08:31)      BRIEF HOSPITAL COURSE:   Pt is a 33 year old male with a pmhx of DM (on metformin), HLD presented with feeling fatigue, frequent urination, abdominal pain N/V, and difficulty breathing progressively getting worse for 1 month. Of note pt noted to not taking his DM medication for the past week as he was losing weight. Upon arrival to the ED he was found to be in DKA with ARF. ph of 7.23. Started on IV insulin and IVF. Admitted to MICU for such       Events last 24 hours:   off inuslin infusion  checks chem and lyts to make sure AG didnt open up  replace potassium   increase premeal insulin to 12 units       PAST MEDICAL & SURGICAL HISTORY:  High cholesterol      Diabetes mellitus        Allergies    No Known Allergies    Intolerances      FAMILY HISTORY:      Review of Systems:  CONSTITUTIONAL: No fever, chills, or fatigue  EYES: No eye pain, visual disturbances, or discharge  ENMT:  No difficulty hearing, tinnitus, vertigo; No sinus or throat pain  NECK: No pain or stiffness  RESPIRATORY: No cough, wheezing, chills or hemoptysis; No shortness of breath  CARDIOVASCULAR: No chest pain, palpitations, dizziness, or leg swelling  GASTROINTESTINAL: No abdominal or epigastric pain. No nausea, vomiting, or hematemesis; No diarrhea or constipation. No melena or hematochezia.  GENITOURINARY: No dysuria, frequency, hematuria, or incontinence  NEUROLOGICAL: No headaches, memory loss, loss of strength, numbness, or tremors  SKIN: No itching, burning, rashes, or lesions   MUSCULOSKELETAL: No joint pain or swelling; No muscle, back, or extremity pain  PSYCHIATRIC: No depression, anxiety, mood swings, or difficulty sleeping      Medications:        ondansetron Injectable 4 milliGRAM(s) IV Push every 6 hours PRN      heparin   Injectable 5000 Unit(s) SubCutaneous every 8 hours        atorvastatin 20 milliGRAM(s) Oral at bedtime  insulin lispro (ADMELOG) corrective regimen sliding scale   SubCutaneous Before meals and at bedtime  insulin lispro Injectable (ADMELOG) 12 Unit(s) SubCutaneous three times a day before meals    potassium chloride   Powder 40 milliEquivalent(s) Oral once      chlorhexidine 2% Cloths 1 Application(s) Topical <User Schedule>            ICU Vital Signs Last 24 Hrs  T(C): 36.6 (16 Mar 2023 19:43), Max: 36.8 (16 Mar 2023 16:10)  T(F): 97.9 (16 Mar 2023 19:43), Max: 98.2 (16 Mar 2023 16:10)  HR: 96 (16 Mar 2023 20:00) (90 - 121)  BP: 133/65 (16 Mar 2023 20:00) (115/75 - 161/88)  BP(mean): 80 (16 Mar 2023 20:00) (80 - 107)  ABP: --  ABP(mean): --  RR: 14 (16 Mar 2023 20:00) (9 - 28)  SpO2: 98% (16 Mar 2023 20:00) (93% - 99%)    O2 Parameters below as of 16 Mar 2023 20:00  Patient On (Oxygen Delivery Method): room air          Vital Signs Last 24 Hrs  T(C): 36.6 (16 Mar 2023 19:43), Max: 36.8 (16 Mar 2023 16:10)  T(F): 97.9 (16 Mar 2023 19:43), Max: 98.2 (16 Mar 2023 16:10)  HR: 96 (16 Mar 2023 20:00) (90 - 121)  BP: 133/65 (16 Mar 2023 20:00) (115/75 - 161/88)  BP(mean): 80 (16 Mar 2023 20:00) (80 - 107)  RR: 14 (16 Mar 2023 20:00) (9 - 28)  SpO2: 98% (16 Mar 2023 20:00) (93% - 99%)    Parameters below as of 16 Mar 2023 20:00  Patient On (Oxygen Delivery Method): room air        ABG - ( 15 Mar 2023 12:36 )  pH, Arterial: 7.23  pH, Blood: x     /  pCO2: <15   /  pO2: 121   / HCO3: incalculable / Base Excess: 0.0   /  SaO2: 99.0                I&O's Detail    15 Mar 2023 07:01  -  16 Mar 2023 07:00  --------------------------------------------------------  IN:    dextrose 5% + lactated ringers: 1450 mL    Insulin: 96 mL    Lactated Ringers: 500 mL    Lactated Ringers Bolus: 2000 mL  Total IN: 4046 mL    OUT:    Voided (mL): 1375 mL  Total OUT: 1375 mL    Total NET: 2671 mL      16 Mar 2023 07:01  -  16 Mar 2023 22:22  --------------------------------------------------------  IN:    dextrose 5% + lactated ringers: 100 mL    Insulin: 6 mL    Oral Fluid: 1700 mL  Total IN: 1806 mL    OUT:    Voided (mL): 1000 mL  Total OUT: 1000 mL    Total NET: 806 mL            LABS:                        15.1   7.81  )-----------( 301      ( 16 Mar 2023 02:55 )             43.7     03-16    138  |  106  |  17  ----------------------------<  332<H>  3.5   |  24  |  1.30    Ca    9.1      16 Mar 2023 21:15  Phos  2.5     03-16  Mg     2.7     03-16    TPro  6.7  /  Alb  3.3  /  TBili  0.5  /  DBili  x   /  AST  17  /  ALT  43  /  AlkPhos  69  03-16          CAPILLARY BLOOD GLUCOSE      POCT Blood Glucose.: 369 mg/dL (16 Mar 2023 21:05)    PT/INR - ( 15 Mar 2023 11:00 )   PT: 11.5 sec;   INR: 0.96 ratio         PTT - ( 15 Mar 2023 11:00 )  PTT:29.5 sec  Urinalysis Basic - ( 15 Mar 2023 09:49 )    Color: Yellow / Appearance: Clear / S.015 / pH: x  Gluc: x / Ketone: Large  / Bili: Negative / Urobili: Negative mg/dL   Blood: x / Protein: 100 mg/dL / Nitrite: Negative   Leuk Esterase: Negative / RBC: 3-5 /HPF / WBC 3-5   Sq Epi: x / Non Sq Epi: Occasional / Bacteria: Few      CULTURES:  Culture Results:   No growth to date. (03-15 @ 11:20)  Culture Results:   No growth to date. (03-15 @ 11:00)      Physical Examination:    General: Adult male in bed, NAD    HEENT: Pupils equal, reactive to light.  Symmetric. dry mucous membranes     PULM: Clear to auscultation bilaterally, no significant sputum production    CVS: Regular rate and rhythm, no murmurs, rubs, or gallops    ABD: Soft, nondistended, nontender, normoactive bowel sounds, no masses    EXT: No edema, nontender    SKIN: Warm     Neuro: awake alert     RADIOLOGY:   < from: Xray Chest 2 Views PA/Lat (03.15.23 @ 11:09) >    INTERPRETATION:  Clinical information: Dyspnea.    TECHNIQUE: PA and lateral views of the chest.    COMPARISON: None available.    FINDINGS: The lungs are clear. The cardiomediastinal silhouette is   normal. The visualized osseous structures are unremarkable.    IMPRESSION: Clear lungs.     Patient is a 33y old  Male who presents with a chief complaint of Nausea/Vomiting/Poor PO intake (16 Mar 2023 08:31)      BRIEF HOSPITAL COURSE:   Pt is a 33 year old male with a pmhx of DM (on metformin), HLD presented with feeling fatigue, frequent urination, abdominal pain N/V, and difficulty breathing progressively getting worse for 1 month. Of note pt noted to not taking his DM medication for the past week as he was losing weight. Upon arrival to the ED he was found to be in DKA with ARF. ph of 7.23. Started on IV insulin and IVF. Admitted to MICU for such       Events last 24 hours:   off inuslin infusion  checks chem and lyts to make sure AG didnt open up  replace potassium   increase premeal insulin to 12 units   add lantus 25 units BID      PAST MEDICAL & SURGICAL HISTORY:  High cholesterol      Diabetes mellitus        Allergies    No Known Allergies    Intolerances      FAMILY HISTORY:      Review of Systems:  CONSTITUTIONAL: No fever, chills, or fatigue  EYES: No eye pain, visual disturbances, or discharge  ENMT:  No difficulty hearing, tinnitus, vertigo; No sinus or throat pain  NECK: No pain or stiffness  RESPIRATORY: No cough, wheezing, chills or hemoptysis; No shortness of breath  CARDIOVASCULAR: No chest pain, palpitations, dizziness, or leg swelling  GASTROINTESTINAL: No abdominal or epigastric pain. No nausea, vomiting, or hematemesis; No diarrhea or constipation. No melena or hematochezia.  GENITOURINARY: No dysuria, frequency, hematuria, or incontinence  NEUROLOGICAL: No headaches, memory loss, loss of strength, numbness, or tremors  SKIN: No itching, burning, rashes, or lesions   MUSCULOSKELETAL: No joint pain or swelling; No muscle, back, or extremity pain  PSYCHIATRIC: No depression, anxiety, mood swings, or difficulty sleeping      Medications:        ondansetron Injectable 4 milliGRAM(s) IV Push every 6 hours PRN      heparin   Injectable 5000 Unit(s) SubCutaneous every 8 hours        atorvastatin 20 milliGRAM(s) Oral at bedtime  insulin lispro (ADMELOG) corrective regimen sliding scale   SubCutaneous Before meals and at bedtime  insulin lispro Injectable (ADMELOG) 12 Unit(s) SubCutaneous three times a day before meals    potassium chloride   Powder 40 milliEquivalent(s) Oral once      chlorhexidine 2% Cloths 1 Application(s) Topical <User Schedule>            ICU Vital Signs Last 24 Hrs  T(C): 36.6 (16 Mar 2023 19:43), Max: 36.8 (16 Mar 2023 16:10)  T(F): 97.9 (16 Mar 2023 19:43), Max: 98.2 (16 Mar 2023 16:10)  HR: 96 (16 Mar 2023 20:00) (90 - 121)  BP: 133/65 (16 Mar 2023 20:00) (115/75 - 161/88)  BP(mean): 80 (16 Mar 2023 20:00) (80 - 107)  ABP: --  ABP(mean): --  RR: 14 (16 Mar 2023 20:00) (9 - 28)  SpO2: 98% (16 Mar 2023 20:00) (93% - 99%)    O2 Parameters below as of 16 Mar 2023 20:00  Patient On (Oxygen Delivery Method): room air          Vital Signs Last 24 Hrs  T(C): 36.6 (16 Mar 2023 19:43), Max: 36.8 (16 Mar 2023 16:10)  T(F): 97.9 (16 Mar 2023 19:43), Max: 98.2 (16 Mar 2023 16:10)  HR: 96 (16 Mar 2023 20:00) (90 - 121)  BP: 133/65 (16 Mar 2023 20:00) (115/75 - 161/88)  BP(mean): 80 (16 Mar 2023 20:00) (80 - 107)  RR: 14 (16 Mar 2023 20:00) (9 - 28)  SpO2: 98% (16 Mar 2023 20:00) (93% - 99%)    Parameters below as of 16 Mar 2023 20:00  Patient On (Oxygen Delivery Method): room air        ABG - ( 15 Mar 2023 12:36 )  pH, Arterial: 7.23  pH, Blood: x     /  pCO2: <15   /  pO2: 121   / HCO3: incalculable / Base Excess: 0.0   /  SaO2: 99.0                I&O's Detail    15 Mar 2023 07:01  -  16 Mar 2023 07:00  --------------------------------------------------------  IN:    dextrose 5% + lactated ringers: 1450 mL    Insulin: 96 mL    Lactated Ringers: 500 mL    Lactated Ringers Bolus: 2000 mL  Total IN: 4046 mL    OUT:    Voided (mL): 1375 mL  Total OUT: 1375 mL    Total NET: 2671 mL      16 Mar 2023 07:01  -  16 Mar 2023 22:22  --------------------------------------------------------  IN:    dextrose 5% + lactated ringers: 100 mL    Insulin: 6 mL    Oral Fluid: 1700 mL  Total IN: 1806 mL    OUT:    Voided (mL): 1000 mL  Total OUT: 1000 mL    Total NET: 806 mL            LABS:                        15.1   7.81  )-----------( 301      ( 16 Mar 2023 02:55 )             43.7     03-16    138  |  106  |  17  ----------------------------<  332<H>  3.5   |  24  |  1.30    Ca    9.1      16 Mar 2023 21:15  Phos  2.5     03-16  Mg     2.7     03-16    TPro  6.7  /  Alb  3.3  /  TBili  0.5  /  DBili  x   /  AST  17  /  ALT  43  /  AlkPhos  69  03-16          CAPILLARY BLOOD GLUCOSE      POCT Blood Glucose.: 369 mg/dL (16 Mar 2023 21:05)    PT/INR - ( 15 Mar 2023 11:00 )   PT: 11.5 sec;   INR: 0.96 ratio         PTT - ( 15 Mar 2023 11:00 )  PTT:29.5 sec  Urinalysis Basic - ( 15 Mar 2023 09:49 )    Color: Yellow / Appearance: Clear / S.015 / pH: x  Gluc: x / Ketone: Large  / Bili: Negative / Urobili: Negative mg/dL   Blood: x / Protein: 100 mg/dL / Nitrite: Negative   Leuk Esterase: Negative / RBC: 3-5 /HPF / WBC 3-5   Sq Epi: x / Non Sq Epi: Occasional / Bacteria: Few      CULTURES:  Culture Results:   No growth to date. (03-15 @ 11:20)  Culture Results:   No growth to date. (03-15 @ 11:00)      Physical Examination:    General: Adult male in bed, NAD    HEENT: Pupils equal, reactive to light.  Symmetric. dry mucous membranes     PULM: Clear to auscultation bilaterally, no significant sputum production    CVS: Regular rate and rhythm, no murmurs, rubs, or gallops    ABD: Soft, nondistended, nontender, normoactive bowel sounds, no masses    EXT: No edema, nontender    SKIN: Warm     Neuro: awake alert     RADIOLOGY:   < from: Xray Chest 2 Views PA/Lat (03.15.23 @ 11:09) >    INTERPRETATION:  Clinical information: Dyspnea.    TECHNIQUE: PA and lateral views of the chest.    COMPARISON: None available.    FINDINGS: The lungs are clear. The cardiomediastinal silhouette is   normal. The visualized osseous structures are unremarkable.    IMPRESSION: Clear lungs.

## 2023-03-16 NOTE — PROGRESS NOTE ADULT - SUBJECTIVE AND OBJECTIVE BOX
HPI:  32 y/o male with DM (on metformin), HLD presented with feeling fatigue, frequent urination, abdominal pain N/V, and difficulty breathing progressively getting worse for 1 month. Pt states unable to tolerate PO with vomiting. Denies fever, chills, chest pain, diarrhea, hematuria. Denies recent travel (last travel to [facility] Sept), no sick contact. Pt reports not being able to take his DM medications for the past week. In ER labs notable for glucose 759, anion gap 25, bicarb 9, TASIA Cr 2.11, pH 7.23. (15 Mar 2023 15:30)      24 hr events: Admitted to ICU on insulin gtt. Transitioned to subcutaneous insulin. Feels better this morning. No chest pain, dyspnea, fevers, chills, nausea, emesis, or diarrhea.     ## ROS:  See above. ROS otherwise negative.     ## Vitals  ICU Vital Signs Last 24 Hrs  T(C): 36.2 (16 Mar 2023 08:00), Max: 36.4 (15 Mar 2023 11:02)  T(F): 97.1 (16 Mar 2023 08:00), Max: 97.6 (15 Mar 2023 11:02)  HR: 93 (16 Mar 2023 08:00) (91 - 121)  BP: 148/87 (16 Mar 2023 08:00) (115/75 - 158/89)  BP(mean): 96 (16 Mar 2023 08:00) (81 - 108)  ABP: --  ABP(mean): --  RR: 10 (16 Mar 2023 08:00) (9 - 26)  SpO2: 98% (16 Mar 2023 08:00) (93% - 100%)    O2 Parameters below as of 15 Mar 2023 20:00  Patient On (Oxygen Delivery Method): room air            ## Physical Exam:  Gen: Adult male lying comfortably in bed, NAD  HEENT: NC/AT sclerae anicteric  Resp: No increased WOB, CTAB  CV: S1, S2  Abd: Soft, + BS  Ext: WWP  Neuro: Awake, alert, follows commands    ## Vent Data      ## Labs:  Chem:  03-16    142  |  114<H>  |  16  ----------------------------<  303<H>  3.9   |  23  |  1.46<H>    Ca    9.2      16 Mar 2023 02:55  Phos  2.3     03-16  Mg     2.6     03-16    TPro  7.2  /  Alb  3.8  /  TBili  0.5  /  DBili  x   /  AST  17  /  ALT  46  /  AlkPhos  77  03-16    LIVER FUNCTIONS - ( 16 Mar 2023 02:55 )  Alb: 3.8 g/dL / Pro: 7.2 gm/dL / ALK PHOS: 77 U/L / ALT: 46 U/L / AST: 17 U/L / GGT: x           CBC:                        15.1   7.81  )-----------( 301      ( 16 Mar 2023 02:55 )             43.7     Coags:  PT/INR - ( 15 Mar 2023 11:00 )   PT: 11.5 sec;   INR: 0.96 ratio         PTT - ( 15 Mar 2023 11:00 )  PTT:29.5 sec    Urinalysis Basic - ( 15 Mar 2023 09:49 )    Color: Yellow / Appearance: Clear / S.015 / pH: x  Gluc: x / Ketone: Large  / Bili: Negative / Urobili: Negative mg/dL   Blood: x / Protein: 100 mg/dL / Nitrite: Negative   Leuk Esterase: Negative / RBC: 3-5 /HPF / WBC 3-5   Sq Epi: x / Non Sq Epi: Occasional / Bacteria: Few        ## Cardiac        ## Blood Gas  ABG - ( 15 Mar 2023 12:36 )  pH, Arterial: 7.23  pH, Blood: x     /  pCO2: <15   /  pO2: 121   / HCO3: incalculable / Base Excess: 0.0   /  SaO2: 99.0                #I/Os  I&O's Detail    15 Mar 2023 07:01  -  16 Mar 2023 07:00  --------------------------------------------------------  IN:    dextrose 5% + lactated ringers: 1450 mL    Insulin: 96 mL    Lactated Ringers: 500 mL    Lactated Ringers Bolus: 2000 mL  Total IN: 4046 mL    OUT:    Voided (mL): 1375 mL  Total OUT: 1375 mL    Total NET: 2671 mL      16 Mar 2023 07:01  -  16 Mar 2023 08:31  --------------------------------------------------------  IN:    dextrose 5% + lactated ringers: 100 mL    Insulin: 6 mL  Total IN: 106 mL    OUT:    Voided (mL): 0 mL  Total OUT: 0 mL    Total NET: 106 mL          ## Imaging:    ## Medications:  MEDICATIONS  (STANDING):  atorvastatin 20 milliGRAM(s) Oral at bedtime  chlorhexidine 2% Cloths 1 Application(s) Topical <User Schedule>  heparin   Injectable 5000 Unit(s) SubCutaneous every 8 hours  insulin glargine Injectable (LANTUS) 20 Unit(s) SubCutaneous once  insulin lispro (ADMELOG) corrective regimen sliding scale   SubCutaneous Before meals and at bedtime  insulin regular Infusion 10 Unit(s)/Hr (10 mL/Hr) IV Continuous <Continuous>    MEDICATIONS  (PRN):  ondansetron Injectable 4 milliGRAM(s) IV Push every 6 hours PRN Nausea and/or Vomiting

## 2023-03-16 NOTE — PROGRESS NOTE ADULT - ASSESSMENT
Pt is a 33 year old male with a pmhx of DM (on metformin), HLD presented with feeling fatigue, frequent urination, abdominal pain N/V, and difficulty breathing progressively getting worse for 1 month, found to have:    1. DKA  2. ARF   3. hypokalemia       Plan  Called tonight for persistent hyperglycemia   will increase pre-meal coverage to 12 units before meals   lantus 25 units in morning and ISS ac/hs   noted hbga1c of greater than 15   consistent carb diet   endocrine follow up  replace potassium with 40meq to target levle above 4   SCDs + heparin for dvt ppx   zofran PRN  Remains in ICU   labs in the morning  Pt is a 33 year old male with a pmhx of DM (on metformin), HLD presented with feeling fatigue, frequent urination, abdominal pain N/V, and difficulty breathing progressively getting worse for 1 month, found to have:    1. DKA  2. ARF   3. hypokalemia       Plan  Called tonight for persistent hyperglycemia   add lantus 25 BID , stat dose now   will increase pre-meal coverage to 12 units before meals   ISS ac/hs   noted hbga1c of greater than 15   consistent carb diet   endocrine follow up  replace potassium with 40meq to target level above 4   SCDs + heparin for dvt ppx   zofran PRN  Remains in ICU   labs in the morning

## 2023-03-16 NOTE — PROGRESS NOTE ADULT - SUBJECTIVE AND OBJECTIVE BOX
Patient is a 33y old  Male who presents with a chief complaint of DKA     (17 Mar 2023 11:07)      Interval History: finger sticks are stable but increased   on Lantus 25 units and prandial lispro 12 units   finger sticks are in 350-400 range     MEDICATIONS  (STANDING):  atorvastatin 20 milliGRAM(s) Oral at bedtime  bacitracin   Ointment 1 Application(s) Topical daily  chlorhexidine 2% Cloths 1 Application(s) Topical daily  heparin   Injectable 7500 Unit(s) SubCutaneous every 8 hours  insulin glargine Injectable (LANTUS) 25 Unit(s) SubCutaneous two times a day  insulin lispro (ADMELOG) corrective regimen sliding scale   SubCutaneous Before meals and at bedtime  insulin lispro Injectable (ADMELOG) 12 Unit(s) SubCutaneous three times a day before meals    MEDICATIONS  (PRN):  ondansetron Injectable 4 milliGRAM(s) IV Push every 6 hours PRN Nausea and/or Vomiting      Allergies    No Known Allergies    Intolerances        REVIEW OF SYSTEMS:  CONSTITUTIONAL: no changes  EYES: No eye pain, visual disturbances, or discharge  ENMT:  No difficulty hearing, No sinus or throat pain  NECK: No pain or stiffness  RESPIRATORY: No cough, wheezing, chills or hemoptysis; No shortness of breath  CARDIOVASCULAR: No chest pain, palpitations or leg swelling  GASTROINTESTINAL: No abdominal or epigastric pain. No nausea, vomiting, or hematemesis; No diarrhea or constipation. No melena or hematochezia.  GENITOURINARY: No dysuria, frequency, hematuria, or incontinence  NEUROLOGICAL: No headaches, memory loss, loss of strength, numbness, or tremors  SKIN: No itching, burning, rashes, or lesions   ENDOCRINE: No heat or cold intolerance; No hair loss  MUSCULOSKELETAL: No joint pain or swelling; No muscle, back, or extremity pain  PSYCHIATRIC: No depression, anxiety, mood swings, or difficulty sleeping  HEME/LYMPH: No easy bruising, or bleeding gums  ALLERY AND IMMUNOLOGIC: No hives or eczema    Vital Signs Last 24 Hrs  T(C): 36.4 (17 Mar 2023 17:52), Max: 37.1 (17 Mar 2023 15:56)  T(F): 97.5 (17 Mar 2023 17:52), Max: 98.7 (17 Mar 2023 15:56)  HR: 104 (17 Mar 2023 17:52) (75 - 107)  BP: 108/75 (17 Mar 2023 17:52) (108/75 - 142/83)  BP(mean): 86 (17 Mar 2023 16:00) (75 - 97)  RR: 15 (17 Mar 2023 17:52) (10 - 29)  SpO2: 97% (17 Mar 2023 17:52) (96% - 100%)    Parameters below as of 17 Mar 2023 17:52  Patient On (Oxygen Delivery Method): room air        PHYSICAL EXAM:  GENERAL:   HEAD: Atraumatic, Normocephalic  EYES: PERRLA, conjunctiva and sclera clear  ENMT: No  exudates,; Moist mucous membranes,, No lesions  NECK: Supple, No JVD, Normal thyroid  NERVOUS SYSTEM:  Alert & Oriented,   CHEST/LUNG: Clear to auscultation bilaterally; No rales, rhonchi, wheezing, or rubs  HEART: Regular rate and rhythm; No murmurs, rubs, or gallops  ABDOMEN: Soft, Nontender, Nondistended; Bowel sounds present  EXTREMITIES:  2+ Peripheral Pulses, no edema  SKIN: No rashes or lesions    LABS:        CAPILLARY BLOOD GLUCOSE      POCT Blood Glucose.: 289 mg/dL (17 Mar 2023 16:29)  POCT Blood Glucose.: 324 mg/dL (17 Mar 2023 15:58)  POCT Blood Glucose.: 301 mg/dL (17 Mar 2023 11:40)  POCT Blood Glucose.: 286 mg/dL (17 Mar 2023 08:00)  POCT Blood Glucose.: 369 mg/dL (16 Mar 2023 21:05)    Lipid panel:           Thyroid:  Diabetes Tests:  Parathyroid Panel:  Adrenals:  RADIOLOGY & ADDITIONAL TESTS:    Imaging Personally Reviewed:  [ ] YES  [ ] NO    Consultant(s) Notes Reviewed:  [ ] YES  [ ] NO    Care Discussed with Consultants/Other Providers [ ] YES  [ ] NO

## 2023-03-16 NOTE — PROGRESS NOTE ADULT - ASSESSMENT
34 y/o M w/T2DM presenting with DKA. TASIA likely prerenal.    - Appreciate endo assistance  - Subcutaneous insulin  - Downgrade to medicine 32 y/o M w/T2DM presenting with DKA. TASIA likely prerenal.    - Appreciate endo assistance  - Subcutaneous insulin  - Trend Cr, avoid nephrotoxins  - Downgrade to medicine

## 2023-03-17 LAB
ALBUMIN SERPL ELPH-MCNC: 3.2 G/DL — LOW (ref 3.3–5)
ALP SERPL-CCNC: 65 U/L — SIGNIFICANT CHANGE UP (ref 40–120)
ALT FLD-CCNC: 45 U/L — SIGNIFICANT CHANGE UP (ref 12–78)
ANION GAP SERPL CALC-SCNC: 10 MMOL/L — SIGNIFICANT CHANGE UP (ref 5–17)
AST SERPL-CCNC: 20 U/L — SIGNIFICANT CHANGE UP (ref 15–37)
BASOPHILS # BLD AUTO: 0.02 K/UL — SIGNIFICANT CHANGE UP (ref 0–0.2)
BASOPHILS NFR BLD AUTO: 0.3 % — SIGNIFICANT CHANGE UP (ref 0–2)
BILIRUB SERPL-MCNC: 0.7 MG/DL — SIGNIFICANT CHANGE UP (ref 0.2–1.2)
BUN SERPL-MCNC: 14 MG/DL — SIGNIFICANT CHANGE UP (ref 7–23)
CALCIUM SERPL-MCNC: 8.8 MG/DL — SIGNIFICANT CHANGE UP (ref 8.5–10.1)
CHLORIDE SERPL-SCNC: 104 MMOL/L — SIGNIFICANT CHANGE UP (ref 96–108)
CO2 SERPL-SCNC: 23 MMOL/L — SIGNIFICANT CHANGE UP (ref 22–31)
CREAT SERPL-MCNC: 1.03 MG/DL — SIGNIFICANT CHANGE UP (ref 0.5–1.3)
EGFR: 100 ML/MIN/1.73M2 — SIGNIFICANT CHANGE UP
EOSINOPHIL # BLD AUTO: 0.08 K/UL — SIGNIFICANT CHANGE UP (ref 0–0.5)
EOSINOPHIL NFR BLD AUTO: 1.4 % — SIGNIFICANT CHANGE UP (ref 0–6)
GLUCOSE BLDC GLUCOMTR-MCNC: 286 MG/DL — HIGH (ref 70–99)
GLUCOSE BLDC GLUCOMTR-MCNC: 289 MG/DL — HIGH (ref 70–99)
GLUCOSE BLDC GLUCOMTR-MCNC: 301 MG/DL — HIGH (ref 70–99)
GLUCOSE BLDC GLUCOMTR-MCNC: 324 MG/DL — HIGH (ref 70–99)
GLUCOSE BLDC GLUCOMTR-MCNC: 334 MG/DL — HIGH (ref 70–99)
GLUCOSE SERPL-MCNC: 330 MG/DL — HIGH (ref 70–99)
HCT VFR BLD CALC: 38.5 % — LOW (ref 39–50)
HGB BLD-MCNC: 13.1 G/DL — SIGNIFICANT CHANGE UP (ref 13–17)
IMM GRANULOCYTES NFR BLD AUTO: 0.3 % — SIGNIFICANT CHANGE UP (ref 0–0.9)
LYMPHOCYTES # BLD AUTO: 2.75 K/UL — SIGNIFICANT CHANGE UP (ref 1–3.3)
LYMPHOCYTES # BLD AUTO: 47.7 % — HIGH (ref 13–44)
MAGNESIUM SERPL-MCNC: 2.3 MG/DL — SIGNIFICANT CHANGE UP (ref 1.6–2.6)
MCHC RBC-ENTMCNC: 28.4 PG — SIGNIFICANT CHANGE UP (ref 27–34)
MCHC RBC-ENTMCNC: 34 G/DL — SIGNIFICANT CHANGE UP (ref 32–36)
MCV RBC AUTO: 83.5 FL — SIGNIFICANT CHANGE UP (ref 80–100)
MONOCYTES # BLD AUTO: 0.83 K/UL — SIGNIFICANT CHANGE UP (ref 0–0.9)
MONOCYTES NFR BLD AUTO: 14.4 % — HIGH (ref 2–14)
NEUTROPHILS # BLD AUTO: 2.06 K/UL — SIGNIFICANT CHANGE UP (ref 1.8–7.4)
NEUTROPHILS NFR BLD AUTO: 35.9 % — LOW (ref 43–77)
NRBC # BLD: 0 /100 WBCS — SIGNIFICANT CHANGE UP (ref 0–0)
PHOSPHATE SERPL-MCNC: 3 MG/DL — SIGNIFICANT CHANGE UP (ref 2.5–4.5)
PLATELET # BLD AUTO: 230 K/UL — SIGNIFICANT CHANGE UP (ref 150–400)
POTASSIUM SERPL-MCNC: 3.7 MMOL/L — SIGNIFICANT CHANGE UP (ref 3.5–5.3)
POTASSIUM SERPL-SCNC: 3.7 MMOL/L — SIGNIFICANT CHANGE UP (ref 3.5–5.3)
PROT SERPL-MCNC: 6.3 GM/DL — SIGNIFICANT CHANGE UP (ref 6–8.3)
RBC # BLD: 4.61 M/UL — SIGNIFICANT CHANGE UP (ref 4.2–5.8)
RBC # FLD: 13.1 % — SIGNIFICANT CHANGE UP (ref 10.3–14.5)
SODIUM SERPL-SCNC: 137 MMOL/L — SIGNIFICANT CHANGE UP (ref 135–145)
WBC # BLD: 5.76 K/UL — SIGNIFICANT CHANGE UP (ref 3.8–10.5)
WBC # FLD AUTO: 5.76 K/UL — SIGNIFICANT CHANGE UP (ref 3.8–10.5)

## 2023-03-17 PROCEDURE — 99232 SBSQ HOSP IP/OBS MODERATE 35: CPT

## 2023-03-17 RX ORDER — BACITRACIN ZINC 500 UNIT/G
1 OINTMENT IN PACKET (EA) TOPICAL DAILY
Refills: 0 | Status: DISCONTINUED | OUTPATIENT
Start: 2023-03-17 | End: 2023-03-20

## 2023-03-17 RX ORDER — HEPARIN SODIUM 5000 [USP'U]/ML
7500 INJECTION INTRAVENOUS; SUBCUTANEOUS EVERY 8 HOURS
Refills: 0 | Status: DISCONTINUED | OUTPATIENT
Start: 2023-03-17 | End: 2023-03-20

## 2023-03-17 RX ORDER — CHLORHEXIDINE GLUCONATE 213 G/1000ML
1 SOLUTION TOPICAL DAILY
Refills: 0 | Status: DISCONTINUED | OUTPATIENT
Start: 2023-03-17 | End: 2023-03-20

## 2023-03-17 RX ADMIN — CHLORHEXIDINE GLUCONATE 1 APPLICATION(S): 213 SOLUTION TOPICAL at 00:00

## 2023-03-17 RX ADMIN — ATORVASTATIN CALCIUM 20 MILLIGRAM(S): 80 TABLET, FILM COATED ORAL at 22:03

## 2023-03-17 RX ADMIN — HEPARIN SODIUM 7500 UNIT(S): 5000 INJECTION INTRAVENOUS; SUBCUTANEOUS at 22:01

## 2023-03-17 RX ADMIN — Medication 12 UNIT(S): at 11:46

## 2023-03-17 RX ADMIN — INSULIN GLARGINE 25 UNIT(S): 100 INJECTION, SOLUTION SUBCUTANEOUS at 08:03

## 2023-03-17 RX ADMIN — Medication 6: at 08:02

## 2023-03-17 RX ADMIN — CHLORHEXIDINE GLUCONATE 1 APPLICATION(S): 213 SOLUTION TOPICAL at 16:08

## 2023-03-17 RX ADMIN — Medication 8: at 16:04

## 2023-03-17 RX ADMIN — Medication 12 UNIT(S): at 08:03

## 2023-03-17 RX ADMIN — HEPARIN SODIUM 7500 UNIT(S): 5000 INJECTION INTRAVENOUS; SUBCUTANEOUS at 13:04

## 2023-03-17 RX ADMIN — HEPARIN SODIUM 5000 UNIT(S): 5000 INJECTION INTRAVENOUS; SUBCUTANEOUS at 05:25

## 2023-03-17 RX ADMIN — Medication 8: at 11:46

## 2023-03-17 RX ADMIN — Medication 12 UNIT(S): at 16:05

## 2023-03-17 RX ADMIN — INSULIN GLARGINE 25 UNIT(S): 100 INJECTION, SOLUTION SUBCUTANEOUS at 22:00

## 2023-03-17 RX ADMIN — Medication 8: at 22:02

## 2023-03-17 RX ADMIN — Medication 1 APPLICATION(S): at 11:47

## 2023-03-17 NOTE — DIETITIAN INITIAL EVALUATION ADULT - OTHER INFO
Pt seen in CCU, adm w/ DKA (HgbA1c = > 15.5 %), not taking meds x 1 week (metformin) , fatigue, frequent urination , abd pain, N/V (resolved) & difficulty breathing progressively x 1 month ; ARF. Adm to CCU for DKA management. Pt follows no particular diet and did not ever check FS/ Denies N/V/D/C/Chewing/Swallowing issues, No food allergies. Educated and provided pt w/ written literature on Blood glucose goals , What's my A1c & Plate method. No c.o at this time. Consuming 100 % meals.

## 2023-03-17 NOTE — DIETITIAN INITIAL EVALUATION ADULT - PERTINENT LABORATORY DATA
03-17    137  |  104  |  14  ----------------------------<  330<H>  3.7   |  23  |  1.03    Ca    8.8      17 Mar 2023 06:00  Phos  3.0     03-17  Mg     2.3     03-17    TPro  6.3  /  Alb  3.2<L>  /  TBili  0.7  /  DBili  x   /  AST  20  /  ALT  45  /  AlkPhos  65  03-17  POCT Blood Glucose.: 286 mg/dL (03-17-23 @ 08:00)  A1C with Estimated Average Glucose Result: >15.5 % (03-15-23 @ 16:36)

## 2023-03-17 NOTE — CHART NOTE - NSCHARTNOTEFT_GEN_A_CORE
34 yo male with DM2 (on metformin), HLD presented with feeling fatigue, frequent urination, abdominal pain N/V, and difficulty breathing progressively getting worse for 1 month.   Pt states unable to tolerate PO with vomiting. Pt reports not being able to take his DM medications for the past week.   In ER labs notable for glucose 759, anion gap 25, bicarb 9, TASIA Cr 2.11, pH 7.23.    Admitted to the ICU with DKA on insulin infusion, TASIA.  Eventually anion gap closed, weaned off insulin infusion and transitioned to basal/bolus.  Tolerating diet.  TASIA now resolved.  Endocrine following.    Stable for transfer to medical floors after DW attending [facility].

## 2023-03-17 NOTE — DIETITIAN INITIAL EVALUATION ADULT - NUTRITIONGOAL OUTCOME1
Pt will verbalize lifestyle change & consume portion controlled consistent carbohydrate meals during Length Of Stay

## 2023-03-17 NOTE — DIETITIAN INITIAL EVALUATION ADULT - PERTINENT MEDS FT
MEDICATIONS  (STANDING):  atorvastatin 20 milliGRAM(s) Oral at bedtime  bacitracin   Ointment 1 Application(s) Topical daily  heparin   Injectable 7500 Unit(s) SubCutaneous every 8 hours  insulin glargine Injectable (LANTUS) 25 Unit(s) SubCutaneous two times a day  insulin lispro (ADMELOG) corrective regimen sliding scale   SubCutaneous Before meals and at bedtime  insulin lispro Injectable (ADMELOG) 12 Unit(s) SubCutaneous three times a day before meals    MEDICATIONS  (PRN):  ondansetron Injectable 4 milliGRAM(s) IV Push every 6 hours PRN Nausea and/or Vomiting

## 2023-03-17 NOTE — PROGRESS NOTE ADULT - SUBJECTIVE AND OBJECTIVE BOX
HPI:  34 y/o male with DM (on metformin), HLD presented with feeling fatigue, frequent urination, abdominal pain N/V, and difficulty breathing progressively getting worse for 1 month. Pt states unable to tolerate PO with vomiting. Denies fever, chills, chest pain, diarrhea, hematuria. Denies recent travel (last travel to [facility] in [place] Sept), no sick contact. Pt reports not being able to take his DM medications for the past week. In ER labs notable for glucose 759, anion gap 25, bicarb 9, TASIA Cr 2.11, pH 7.23. (15 Mar 2023 15:30)      24 hr events: No acute events. Sugars remain high. No chest pain, dyspnea, fevers, chills, nausea, emesis, or diarrhea.     ## ROS:  See above. ROS otherwise negative.     ## Vitals  ICU Vital Signs Last 24 Hrs  T(C): 36.4 (17 Mar 2023 07:36), Max: 36.8 (16 Mar 2023 16:10)  T(F): 97.5 (17 Mar 2023 07:36), Max: 98.2 (16 Mar 2023 16:10)  HR: 85 (17 Mar 2023 07:00) (80 - 113)  BP: 110/64 (17 Mar 2023 07:00) (110/64 - 161/88)  BP(mean): 75 (17 Mar 2023 07:00) (75 - 107)  ABP: --  ABP(mean): --  RR: 11 (17 Mar 2023 07:00) (10 - 28)  SpO2: 100% (17 Mar 2023 07:00) (93% - 100%)    O2 Parameters below as of 17 Mar 2023 04:00  Patient On (Oxygen Delivery Method): room air            ## Physical Exam:  Gen: Adult male lying comfortably in bed, NAD  HEENT: NC/AT sclerae anicteric  Resp: No increased WOB, CTAB  CV: S1, S2  Abd: Soft, + BS  Ext: WWP  Neuro: Awake, alert, follows commands    ## Vent Data      ## Labs:  Chem:      137  |  104  |  14  ----------------------------<  330<H>  3.7   |  23  |  1.03    Ca    8.8      17 Mar 2023 06:00  Phos  3.0     03-17  Mg     2.3     03-17    TPro  6.3  /  Alb  3.2<L>  /  TBili  0.7  /  DBili  x   /  AST  20  /  ALT  45  /  AlkPhos  65  03-17    LIVER FUNCTIONS - ( 17 Mar 2023 06:00 )  Alb: 3.2 g/dL / Pro: 6.3 gm/dL / ALK PHOS: 65 U/L / ALT: 45 U/L / AST: 20 U/L / GGT: x           CBC:                        13.1   5.76  )-----------( 230      ( 17 Mar 2023 06:00 )             38.5     Coags:  PT/INR - ( 15 Mar 2023 11:00 )   PT: 11.5 sec;   INR: 0.96 ratio         PTT - ( 15 Mar 2023 11:00 )  PTT:29.5 sec    Urinalysis Basic - ( 15 Mar 2023 09:49 )    Color: Yellow / Appearance: Clear / S.015 / pH: x  Gluc: x / Ketone: Large  / Bili: Negative / Urobili: Negative mg/dL   Blood: x / Protein: 100 mg/dL / Nitrite: Negative   Leuk Esterase: Negative / RBC: 3-5 /HPF / WBC 3-5   Sq Epi: x / Non Sq Epi: Occasional / Bacteria: Few        ## Cardiac        ## Blood Gas  ABG - ( 15 Mar 2023 12:36 )  pH, Arterial: 7.23  pH, Blood: x     /  pCO2: <15   /  pO2: 121   / HCO3: incalculable / Base Excess: 0.0   /  SaO2: 99.0                #I/Os  I&O's Detail    16 Mar 2023 07:01  -  17 Mar 2023 07:00  --------------------------------------------------------  IN:    dextrose 5% + lactated ringers: 100 mL    Insulin: 6 mL    Oral Fluid: 2600 mL  Total IN: 2706 mL    OUT:    Voided (mL): 1820 mL  Total OUT: 1820 mL    Total NET: 886 mL          ## Imaging:    ## Medications:  MEDICATIONS  (STANDING):  atorvastatin 20 milliGRAM(s) Oral at bedtime  chlorhexidine 2% Cloths 1 Application(s) Topical <User Schedule>  heparin   Injectable 5000 Unit(s) SubCutaneous every 8 hours  insulin glargine Injectable (LANTUS) 25 Unit(s) SubCutaneous two times a day  insulin lispro (ADMELOG) corrective regimen sliding scale   SubCutaneous Before meals and at bedtime  insulin lispro Injectable (ADMELOG) 12 Unit(s) SubCutaneous three times a day before meals    MEDICATIONS  (PRN):  ondansetron Injectable 4 milliGRAM(s) IV Push every 6 hours PRN Nausea and/or Vomiting

## 2023-03-17 NOTE — CHART NOTE - NSCHARTNOTEFT_GEN_A_CORE
Called pt's PCP Dr Emily Cook [phone] at the request of pt's daughter.  PCP was updated of current condition, all questions and concerns answered to apparent satisfaction.

## 2023-03-17 NOTE — DIETITIAN INITIAL EVALUATION ADULT - NUTRITION DIAGNOSIS
Refill Requested: pantoprazole 40 mg   Last Filled: 10/30/17 #60 with 11 refills    Pharmacy is now requesting 90 day supply. New script sent.     
yes...

## 2023-03-17 NOTE — PROGRESS NOTE ADULT - SUBJECTIVE AND OBJECTIVE BOX
Patient is a 33y old  Male who presents with a chief complaint of DKA     (17 Mar 2023 11:07)      Interval History: continued on Lantus 25 units and prandial lispro 12 units   Nutrition increased , finger sticks are in 300s     MEDICATIONS  (STANDING):  atorvastatin 20 milliGRAM(s) Oral at bedtime  bacitracin   Ointment 1 Application(s) Topical daily  chlorhexidine 2% Cloths 1 Application(s) Topical daily  heparin   Injectable 7500 Unit(s) SubCutaneous every 8 hours  insulin glargine Injectable (LANTUS) 25 Unit(s) SubCutaneous two times a day  insulin lispro (ADMELOG) corrective regimen sliding scale   SubCutaneous Before meals and at bedtime  insulin lispro Injectable (ADMELOG) 12 Unit(s) SubCutaneous three times a day before meals    MEDICATIONS  (PRN):  ondansetron Injectable 4 milliGRAM(s) IV Push every 6 hours PRN Nausea and/or Vomiting      Allergies    No Known Allergies    Intolerances        REVIEW OF SYSTEMS:  CONSTITUTIONAL: no changes  EYES: No eye pain, visual disturbances, or discharge  ENMT:  No difficulty hearing, No sinus or throat pain  NECK: No pain or stiffness  RESPIRATORY: No cough, wheezing, chills or hemoptysis; No shortness of breath  CARDIOVASCULAR: No chest pain, palpitations or leg swelling  GASTROINTESTINAL: No abdominal or epigastric pain. No nausea, vomiting, or hematemesis; No diarrhea or constipation. No melena or hematochezia.  GENITOURINARY: No dysuria, frequency, hematuria, or incontinence  NEUROLOGICAL: No headaches, memory loss, loss of strength, numbness, or tremors  SKIN: No itching, burning, rashes, or lesions   ENDOCRINE: No heat or cold intolerance; No hair loss  MUSCULOSKELETAL: No joint pain or swelling; No muscle, back, or extremity pain  PSYCHIATRIC: No depression, anxiety, mood swings, or difficulty sleeping  HEME/LYMPH: No easy bruising, or bleeding gums  ALLERY AND IMMUNOLOGIC: No hives or eczema    Vital Signs Last 24 Hrs  T(C): 36.4 (17 Mar 2023 17:52), Max: 37.1 (17 Mar 2023 15:56)  T(F): 97.5 (17 Mar 2023 17:52), Max: 98.7 (17 Mar 2023 15:56)  HR: 104 (17 Mar 2023 17:52) (75 - 107)  BP: 108/75 (17 Mar 2023 17:52) (108/75 - 142/83)  BP(mean): 86 (17 Mar 2023 16:00) (75 - 97)  RR: 15 (17 Mar 2023 17:52) (10 - 29)  SpO2: 97% (17 Mar 2023 17:52) (96% - 100%)    Parameters below as of 17 Mar 2023 17:52  Patient On (Oxygen Delivery Method): room air        PHYSICAL EXAM:  GENERAL:   HEAD: Atraumatic, Normocephalic  EYES: PERRLA, conjunctiva and sclera clear  ENMT: No  exudates,; Moist mucous membranes,, No lesions  NECK: Supple, No JVD, Normal thyroid  NERVOUS SYSTEM:  Alert & Oriented,   CHEST/LUNG: Clear to auscultation bilaterally; No rales, rhonchi, wheezing, or rubs  HEART: Regular rate and rhythm; No murmurs, rubs, or gallops  ABDOMEN: Soft, Nontender, Nondistended; Bowel sounds present  EXTREMITIES:  2+ Peripheral Pulses, no edema  SKIN: No rashes or lesions    LABS:        CAPILLARY BLOOD GLUCOSE      POCT Blood Glucose.: 289 mg/dL (17 Mar 2023 16:29)  POCT Blood Glucose.: 324 mg/dL (17 Mar 2023 15:58)  POCT Blood Glucose.: 301 mg/dL (17 Mar 2023 11:40)  POCT Blood Glucose.: 286 mg/dL (17 Mar 2023 08:00)  POCT Blood Glucose.: 369 mg/dL (16 Mar 2023 21:05)    Lipid panel:           Thyroid:  Diabetes Tests:  Parathyroid Panel:  Adrenals:  RADIOLOGY & ADDITIONAL TESTS:    Imaging Personally Reviewed:  [ ] YES  [ ] NO    Consultant(s) Notes Reviewed:  [ ] YES  [ ] NO    Care Discussed with Consultants/Other Providers [ ] YES  [ ] NO

## 2023-03-17 NOTE — PROGRESS NOTE ADULT - ASSESSMENT
34 y/o M w/T2DM presenting with DKA. TASIA likely prerenal.    - Appreciate endo assistance  - Subcutaneous insulin  - Trend Cr, avoid nephrotoxins  - Downgrade to medicine

## 2023-03-18 LAB
-  AMPICILLIN: SIGNIFICANT CHANGE UP
-  CIPROFLOXACIN: SIGNIFICANT CHANGE UP
-  LEVOFLOXACIN: SIGNIFICANT CHANGE UP
-  NITROFURANTOIN: SIGNIFICANT CHANGE UP
-  TETRACYCLINE: SIGNIFICANT CHANGE UP
-  VANCOMYCIN: SIGNIFICANT CHANGE UP
ALBUMIN SERPL ELPH-MCNC: 3.1 G/DL — LOW (ref 3.3–5)
ALP SERPL-CCNC: 66 U/L — SIGNIFICANT CHANGE UP (ref 40–120)
ALT FLD-CCNC: 40 U/L — SIGNIFICANT CHANGE UP (ref 12–78)
ANION GAP SERPL CALC-SCNC: 8 MMOL/L — SIGNIFICANT CHANGE UP (ref 5–17)
AST SERPL-CCNC: 18 U/L — SIGNIFICANT CHANGE UP (ref 15–37)
BASOPHILS # BLD AUTO: 0.02 K/UL — SIGNIFICANT CHANGE UP (ref 0–0.2)
BASOPHILS NFR BLD AUTO: 0.4 % — SIGNIFICANT CHANGE UP (ref 0–2)
BILIRUB SERPL-MCNC: 0.5 MG/DL — SIGNIFICANT CHANGE UP (ref 0.2–1.2)
BUN SERPL-MCNC: 13 MG/DL — SIGNIFICANT CHANGE UP (ref 7–23)
C PEPTIDE SERPL-MCNC: 0.4 NG/ML — LOW (ref 1.1–4.4)
CALCIUM SERPL-MCNC: 8.8 MG/DL — SIGNIFICANT CHANGE UP (ref 8.5–10.1)
CHLORIDE SERPL-SCNC: 99 MMOL/L — SIGNIFICANT CHANGE UP (ref 96–108)
CO2 SERPL-SCNC: 25 MMOL/L — SIGNIFICANT CHANGE UP (ref 22–31)
CREAT SERPL-MCNC: 0.81 MG/DL — SIGNIFICANT CHANGE UP (ref 0.5–1.3)
CULTURE RESULTS: SIGNIFICANT CHANGE UP
EGFR: 121 ML/MIN/1.73M2 — SIGNIFICANT CHANGE UP
EOSINOPHIL # BLD AUTO: 0.1 K/UL — SIGNIFICANT CHANGE UP (ref 0–0.5)
EOSINOPHIL NFR BLD AUTO: 2.1 % — SIGNIFICANT CHANGE UP (ref 0–6)
GLUCOSE BLDC GLUCOMTR-MCNC: 260 MG/DL — HIGH (ref 70–99)
GLUCOSE BLDC GLUCOMTR-MCNC: 288 MG/DL — HIGH (ref 70–99)
GLUCOSE BLDC GLUCOMTR-MCNC: 313 MG/DL — HIGH (ref 70–99)
GLUCOSE BLDC GLUCOMTR-MCNC: 348 MG/DL — HIGH (ref 70–99)
GLUCOSE SERPL-MCNC: 326 MG/DL — HIGH (ref 70–99)
HCT VFR BLD CALC: 39.9 % — SIGNIFICANT CHANGE UP (ref 39–50)
HGB BLD-MCNC: 13.5 G/DL — SIGNIFICANT CHANGE UP (ref 13–17)
IMM GRANULOCYTES NFR BLD AUTO: 0.4 % — SIGNIFICANT CHANGE UP (ref 0–0.9)
LYMPHOCYTES # BLD AUTO: 2.48 K/UL — SIGNIFICANT CHANGE UP (ref 1–3.3)
LYMPHOCYTES # BLD AUTO: 52.9 % — HIGH (ref 13–44)
MAGNESIUM SERPL-MCNC: 2.2 MG/DL — SIGNIFICANT CHANGE UP (ref 1.6–2.6)
MCHC RBC-ENTMCNC: 28 PG — SIGNIFICANT CHANGE UP (ref 27–34)
MCHC RBC-ENTMCNC: 33.8 G/DL — SIGNIFICANT CHANGE UP (ref 32–36)
MCV RBC AUTO: 82.8 FL — SIGNIFICANT CHANGE UP (ref 80–100)
METHOD TYPE: SIGNIFICANT CHANGE UP
MONOCYTES # BLD AUTO: 0.62 K/UL — SIGNIFICANT CHANGE UP (ref 0–0.9)
MONOCYTES NFR BLD AUTO: 13.2 % — SIGNIFICANT CHANGE UP (ref 2–14)
NEUTROPHILS # BLD AUTO: 1.45 K/UL — LOW (ref 1.8–7.4)
NEUTROPHILS NFR BLD AUTO: 31 % — LOW (ref 43–77)
NRBC # BLD: 0 /100 WBCS — SIGNIFICANT CHANGE UP (ref 0–0)
ORGANISM # SPEC MICROSCOPIC CNT: SIGNIFICANT CHANGE UP
ORGANISM # SPEC MICROSCOPIC CNT: SIGNIFICANT CHANGE UP
PHOSPHATE SERPL-MCNC: 4 MG/DL — SIGNIFICANT CHANGE UP (ref 2.5–4.5)
PLATELET # BLD AUTO: 215 K/UL — SIGNIFICANT CHANGE UP (ref 150–400)
POTASSIUM SERPL-MCNC: 3.1 MMOL/L — LOW (ref 3.5–5.3)
POTASSIUM SERPL-SCNC: 3.1 MMOL/L — LOW (ref 3.5–5.3)
PROT SERPL-MCNC: 6.3 GM/DL — SIGNIFICANT CHANGE UP (ref 6–8.3)
RBC # BLD: 4.82 M/UL — SIGNIFICANT CHANGE UP (ref 4.2–5.8)
RBC # FLD: 12.5 % — SIGNIFICANT CHANGE UP (ref 10.3–14.5)
SODIUM SERPL-SCNC: 132 MMOL/L — LOW (ref 135–145)
SPECIMEN SOURCE: SIGNIFICANT CHANGE UP
WBC # BLD: 4.69 K/UL — SIGNIFICANT CHANGE UP (ref 3.8–10.5)
WBC # FLD AUTO: 4.69 K/UL — SIGNIFICANT CHANGE UP (ref 3.8–10.5)

## 2023-03-18 PROCEDURE — 99233 SBSQ HOSP IP/OBS HIGH 50: CPT

## 2023-03-18 RX ORDER — INSULIN GLARGINE 100 [IU]/ML
30 INJECTION, SOLUTION SUBCUTANEOUS AT BEDTIME
Refills: 0 | Status: DISCONTINUED | OUTPATIENT
Start: 2023-03-18 | End: 2023-03-20

## 2023-03-18 RX ORDER — POTASSIUM CHLORIDE 20 MEQ
40 PACKET (EA) ORAL EVERY 4 HOURS
Refills: 0 | Status: COMPLETED | OUTPATIENT
Start: 2023-03-18 | End: 2023-03-18

## 2023-03-18 RX ORDER — INSULIN GLARGINE 100 [IU]/ML
30 INJECTION, SOLUTION SUBCUTANEOUS EVERY MORNING
Refills: 0 | Status: DISCONTINUED | OUTPATIENT
Start: 2023-03-19 | End: 2023-03-20

## 2023-03-18 RX ORDER — DEXTROSE 50 % IN WATER 50 %
15 SYRINGE (ML) INTRAVENOUS ONCE
Refills: 0 | Status: DISCONTINUED | OUTPATIENT
Start: 2023-03-18 | End: 2023-03-20

## 2023-03-18 RX ADMIN — Medication 1 APPLICATION(S): at 13:27

## 2023-03-18 RX ADMIN — Medication 40 MILLIEQUIVALENT(S): at 10:12

## 2023-03-18 RX ADMIN — INSULIN GLARGINE 30 UNIT(S): 100 INJECTION, SOLUTION SUBCUTANEOUS at 22:08

## 2023-03-18 RX ADMIN — INSULIN GLARGINE 25 UNIT(S): 100 INJECTION, SOLUTION SUBCUTANEOUS at 08:23

## 2023-03-18 RX ADMIN — Medication 8: at 22:08

## 2023-03-18 RX ADMIN — Medication 8: at 08:05

## 2023-03-18 RX ADMIN — Medication 6: at 17:08

## 2023-03-18 RX ADMIN — Medication 6: at 12:05

## 2023-03-18 RX ADMIN — Medication 12 UNIT(S): at 17:09

## 2023-03-18 RX ADMIN — Medication 40 MILLIEQUIVALENT(S): at 13:30

## 2023-03-18 RX ADMIN — CHLORHEXIDINE GLUCONATE 1 APPLICATION(S): 213 SOLUTION TOPICAL at 13:27

## 2023-03-18 RX ADMIN — Medication 12 UNIT(S): at 12:06

## 2023-03-18 RX ADMIN — Medication 12 UNIT(S): at 08:05

## 2023-03-18 RX ADMIN — ATORVASTATIN CALCIUM 20 MILLIGRAM(S): 80 TABLET, FILM COATED ORAL at 22:08

## 2023-03-18 NOTE — PROGRESS NOTE ADULT - SUBJECTIVE AND OBJECTIVE BOX
C/o Dizziness and some gait imbalance.  Denies headache, fever or chills.        PHYSICAL EXAM:  GENERAL: NAD, lying in bed comfortably  CHEST/LUNG: Clear to auscultation bilaterally; No rales, rhonchi, wheezing, or rubs. Unlabored respirations  HEART: Regular rate and rhythm; No murmurs, rubs, or gallops  ABDOMEN: Bowel sounds present; Soft, Nontender, Nondistended. No hepatomegally  EXTREMITIES:  2+ Peripheral Pulses, brisk capillary refill. No clubbing, cyanosis, or edema  NERVOUS SYSTEM:  Alert & Oriented X3, speech clear. No deficits   MSK: FROM all 4 extremities, full and equal strength  SKIN: No rashes or lesions.      LABS:                        13.5   4.69  )-----------( 215      ( 18 Mar 2023 07:01 )             39.9     03-18    132<L>  |  99  |  13  ----------------------------<  326<H>  3.1<L>   |  25  |  0.81    Ca    8.8      18 Mar 2023 07:01  Phos  4.0     03-18  Mg     2.2     03-18    TPro  6.3  /  Alb  3.1<L>  /  TBili  0.5  /  DBili  x   /  AST  18  /  ALT  40  /  AlkPhos  66  03-18        < from: US Abdomen Upper Quadrant Right (03.16.23 @ 06:59) >  IMPRESSION:  Hepatic steatosis.        --- End of Report ---    < end of copied text >      < from: Xray Chest 2 Views PA/Lat (03.15.23 @ 11:09) >  IMPRESSION: Clear lungs.    --- End of Report ---    < end of copied text >        MEDICATIONS  (STANDING):  atorvastatin 20 milliGRAM(s) Oral at bedtime  bacitracin   Ointment 1 Application(s) Topical daily  chlorhexidine 2% Cloths 1 Application(s) Topical daily  heparin   Injectable 7500 Unit(s) SubCutaneous every 8 hours  insulin glargine Injectable (LANTUS) 25 Unit(s) SubCutaneous two times a day  insulin lispro (ADMELOG) corrective regimen sliding scale   SubCutaneous Before meals and at bedtime  insulin lispro Injectable (ADMELOG) 12 Unit(s) SubCutaneous three times a day before meals  potassium chloride    Tablet ER 40 milliEquivalent(s) Oral every 4 hours        Home Medications:  atorvastatin 20 mg oral tablet: 1 tab(s) orally once a day (15 Mar 2023 15:34)  metFORMIN 500 mg oral tablet: 1 tab(s) orally 2 times a day (15 Mar 2023 15:34)

## 2023-03-18 NOTE — PROGRESS NOTE ADULT - SUBJECTIVE AND OBJECTIVE BOX
Patient is a 33y old  Male who presents with a chief complaint of Nausea/Vomiting/Poor PO intake (18 Mar 2023 12:44)      Interval History: finger sticks are in 250-300 range   on Lantus 60 units in divided doses and increased prandial lispro   increased Insulin Resistance   at home was on Metformin   C-Peptide Pending although suspect declining reserve   patient may be in honeymoon phase of type 1 diabetes     MEDICATIONS  (STANDING):  atorvastatin 20 milliGRAM(s) Oral at bedtime  bacitracin   Ointment 1 Application(s) Topical daily  chlorhexidine 2% Cloths 1 Application(s) Topical daily  heparin   Injectable 7500 Unit(s) SubCutaneous every 8 hours  insulin glargine Injectable (LANTUS) 30 Unit(s) SubCutaneous every morning  insulin glargine Injectable (LANTUS) 30 Unit(s) SubCutaneous at bedtime  insulin lispro (ADMELOG) corrective regimen sliding scale   SubCutaneous Before meals and at bedtime  insulin lispro Injectable (ADMELOG) 12 Unit(s) SubCutaneous three times a day before meals    MEDICATIONS  (PRN):  dextrose Oral Gel 15 Gram(s) Oral once PRN Blood Glucose LESS THAN 70 milliGRAM(s)/deciliter  ondansetron Injectable 4 milliGRAM(s) IV Push every 6 hours PRN Nausea and/or Vomiting      Allergies    No Known Allergies    Intolerances        REVIEW OF SYSTEMS:  CONSTITUTIONAL: no changes  EYES: No eye pain, visual disturbances, or discharge  ENMT:  No difficulty hearing, No sinus or throat pain  NECK: No pain or stiffness  RESPIRATORY: No cough, wheezing, chills or hemoptysis; No shortness of breath  CARDIOVASCULAR: No chest pain, palpitations or leg swelling  GASTROINTESTINAL: No abdominal or epigastric pain. No nausea, vomiting, or hematemesis; No diarrhea or constipation. No melena or hematochezia.  GENITOURINARY: No dysuria, frequency, hematuria, or incontinence  NEUROLOGICAL: No headaches, memory loss, loss of strength, numbness, or tremors  SKIN: No itching, burning, rashes, or lesions   ENDOCRINE: No heat or cold intolerance; No hair loss  MUSCULOSKELETAL: No joint pain or swelling; No muscle, back, or extremity pain  PSYCHIATRIC: No depression, anxiety, mood swings, or difficulty sleeping  HEME/LYMPH: No easy bruising, or bleeding gums  ALLERY AND IMMUNOLOGIC: No hives or eczema    Vital Signs Last 24 Hrs  T(C): 36.7 (19 Mar 2023 10:53), Max: 36.7 (18 Mar 2023 17:33)  T(F): 98 (19 Mar 2023 10:53), Max: 98.1 (18 Mar 2023 17:33)  HR: 101 (19 Mar 2023 10:53) (82 - 101)  BP: 121/78 (19 Mar 2023 10:53) (121/78 - 126/78)  BP(mean): --  RR: 18 (19 Mar 2023 10:53) (18 - 18)  SpO2: 98% (19 Mar 2023 10:53) (98% - 100%)    Parameters below as of 19 Mar 2023 10:53  Patient On (Oxygen Delivery Method): room air        PHYSICAL EXAM:  GENERAL:   HEAD: Atraumatic, Normocephalic  EYES: PERRLA, conjunctiva and sclera clear  ENMT: No  exudates,; Moist mucous membranes,, No lesions  NECK: Supple, No JVD, Normal thyroid  NERVOUS SYSTEM:  Alert & Oriented,   CHEST/LUNG: Clear to auscultation bilaterally; No rales, rhonchi, wheezing, or rubs  HEART: Regular rate and rhythm; No murmurs, rubs, or gallops  ABDOMEN: Soft, Nontender, Nondistended; Bowel sounds present  EXTREMITIES:  2+ Peripheral Pulses, no edema  SKIN: No rashes or lesions    LABS:        CAPILLARY BLOOD GLUCOSE      POCT Blood Glucose.: 296 mg/dL (19 Mar 2023 11:55)  POCT Blood Glucose.: 283 mg/dL (19 Mar 2023 07:28)  POCT Blood Glucose.: 348 mg/dL (18 Mar 2023 21:16)  POCT Blood Glucose.: 260 mg/dL (18 Mar 2023 16:55)    Lipid panel:           Thyroid:  Diabetes Tests:  Parathyroid Panel:  Adrenals:  RADIOLOGY & ADDITIONAL TESTS:    Imaging Personally Reviewed:  [ ] YES  [ ] NO    Consultant(s) Notes Reviewed:  [ ] YES  [ ] NO    Care Discussed with Consultants/Other Providers [ ] YES  [ ] NO

## 2023-03-18 NOTE — PROGRESS NOTE ADULT - ASSESSMENT
34 yo male with DM2 (on metformin), HLD presented with feeling fatigue, frequent urination, abdominal pain N/V, and difficulty breathing progressively getting worse for 1 month.   Pt states unable to tolerate PO with vomiting. Pt reports not being able to take his DM medications for the past week.   Admitted to ICU with DKA. Off insulin drip, Anion gap closed.  Transferred out of ICU to floor on 03/17/2023.      # DKA with DM-2.  Anion gap closed.  A1c >15.5.  .  Increase Glargine from 25 to 30 units sq BID. 32 yo male with DM2 (on metformin), HLD presented with feeling fatigue, frequent urination, abdominal pain N/V, and difficulty breathing progressively getting worse for 1 month.   Pt states unable to tolerate PO with vomiting. Pt reports not being able to take his DM medications for the past week.   Admitted to ICU with DKA. Off insulin drip, Anion gap closed.  Transferred out of ICU to floor on 03/17/2023.      # DKA with DM-2.  DKA resolved. Anion gap closed.  A1c >15.5.  .  Increase Glargine from 25 to 30 units sq BID.  ISS with coverage.  Accu-checks q AC & HS.  Endocrinology following.    #Acute hypokalemia.  K 3.1.  KCl 40 mEq po x2 doses.  Check level in am.    # TASIA  Resolved.  Cr 2.11->1.03->0.81.    # Dyslipidemia.  On Atorvastatin.

## 2023-03-19 LAB
GLUCOSE BLDC GLUCOMTR-MCNC: 256 MG/DL — HIGH (ref 70–99)
GLUCOSE BLDC GLUCOMTR-MCNC: 267 MG/DL — HIGH (ref 70–99)
GLUCOSE BLDC GLUCOMTR-MCNC: 283 MG/DL — HIGH (ref 70–99)
GLUCOSE BLDC GLUCOMTR-MCNC: 296 MG/DL — HIGH (ref 70–99)
MAGNESIUM SERPL-MCNC: 2.1 MG/DL — SIGNIFICANT CHANGE UP (ref 1.6–2.6)
POTASSIUM SERPL-MCNC: 3.8 MMOL/L — SIGNIFICANT CHANGE UP (ref 3.5–5.3)
POTASSIUM SERPL-SCNC: 3.8 MMOL/L — SIGNIFICANT CHANGE UP (ref 3.5–5.3)

## 2023-03-19 PROCEDURE — 99232 SBSQ HOSP IP/OBS MODERATE 35: CPT

## 2023-03-19 RX ORDER — BACITRACIN ZINC 500 UNIT/G
1 OINTMENT IN PACKET (EA) TOPICAL DAILY
Refills: 0 | Status: DISCONTINUED | OUTPATIENT
Start: 2023-03-19 | End: 2023-03-19

## 2023-03-19 RX ADMIN — INSULIN GLARGINE 30 UNIT(S): 100 INJECTION, SOLUTION SUBCUTANEOUS at 22:29

## 2023-03-19 RX ADMIN — CHLORHEXIDINE GLUCONATE 1 APPLICATION(S): 213 SOLUTION TOPICAL at 12:29

## 2023-03-19 RX ADMIN — HEPARIN SODIUM 7500 UNIT(S): 5000 INJECTION INTRAVENOUS; SUBCUTANEOUS at 22:14

## 2023-03-19 RX ADMIN — INSULIN GLARGINE 30 UNIT(S): 100 INJECTION, SOLUTION SUBCUTANEOUS at 08:19

## 2023-03-19 RX ADMIN — Medication 6: at 16:45

## 2023-03-19 RX ADMIN — Medication 1 APPLICATION(S): at 16:46

## 2023-03-19 RX ADMIN — ATORVASTATIN CALCIUM 20 MILLIGRAM(S): 80 TABLET, FILM COATED ORAL at 22:14

## 2023-03-19 RX ADMIN — Medication 6: at 22:18

## 2023-03-19 RX ADMIN — Medication 6: at 08:18

## 2023-03-19 RX ADMIN — Medication 6: at 12:26

## 2023-03-19 RX ADMIN — Medication 12 UNIT(S): at 12:27

## 2023-03-19 RX ADMIN — Medication 12 UNIT(S): at 16:45

## 2023-03-19 RX ADMIN — Medication 12 UNIT(S): at 08:18

## 2023-03-19 NOTE — PROGRESS NOTE ADULT - SUBJECTIVE AND OBJECTIVE BOX
C/o Feeling better now.  Denies any complaints.      PHYSICAL EXAM:  GENERAL: NAD, lying in bed comfortably  CHEST/LUNG: Clear to auscultation bilaterally; No rales, rhonchi, wheezing, or rubs. Unlabored respirations  HEART: Regular rate and rhythm; No murmurs, rubs, or gallops  ABDOMEN: Bowel sounds present; Soft, Nontender, Nondistended. No hepatomegaly   EXTREMITIES:  2+ Peripheral Pulses, brisk capillary refill. No clubbing, cyanosis, or edema  NERVOUS SYSTEM:  Alert & Oriented X3, speech clear. No deficits   MSK: FROM all 4 extremities, full and equal strength  SKIN: No rashes or lesions.        LABS:                        13.5   4.69  )-----------( 215      ( 18 Mar 2023 07:01 )             39.9     03-19    x   |  x   |  x   ----------------------------<  x   3.8   |  x   |  x     Ca    8.8      18 Mar 2023 07:01  Phos  4.0     03-18  Mg     2.1     03-19    TPro  6.3  /  Alb  3.1<L>  /  TBili  0.5  /  DBili  x   /  AST  18  /  ALT  40  /  AlkPhos  66  03-18                < from: US Abdomen Upper Quadrant Right (03.16.23 @ 06:59) >  IMPRESSION:  Hepatic steatosis.        --- End of Report ---    < end of copied text >      < from: Xray Chest 2 Views PA/Lat (03.15.23 @ 11:09) >  IMPRESSION: Clear lungs.    --- End of Report ---    < end of copied text >        MEDICATIONS  (STANDING):  atorvastatin 20 milliGRAM(s) Oral at bedtime  bacitracin   Ointment 1 Application(s) Topical daily  chlorhexidine 2% Cloths 1 Application(s) Topical daily  heparin   Injectable 7500 Unit(s) SubCutaneous every 8 hours  insulin glargine Injectable (LANTUS) 30 Unit(s) SubCutaneous every morning  insulin glargine Injectable (LANTUS) 30 Unit(s) SubCutaneous at bedtime  insulin lispro (ADMELOG) corrective regimen sliding scale   SubCutaneous Before meals and at bedtime  insulin lispro Injectable (ADMELOG) 12 Unit(s) SubCutaneous three times a day before meals          Home Medications:  atorvastatin 20 mg oral tablet: 1 tab(s) orally once a day (15 Mar 2023 15:34)  metFORMIN 500 mg oral tablet: 1 tab(s) orally 2 times a day (15 Mar 2023 15:34)

## 2023-03-19 NOTE — PROGRESS NOTE ADULT - ASSESSMENT
34 yo male with DM2 (on metformin), HLD presented with feeling fatigue, frequent urination, abdominal pain N/V, and difficulty breathing progressively getting worse for 1 month.   Pt states unable to tolerate PO with vomiting. Pt reports not being able to take his DM medications for the past week.   Admitted to ICU with DKA. Off insulin drip, Anion gap closed.  Transferred out of ICU to floor on 03/17/2023.      # DKA with DM-2->1.5.  DKA resolved. Anion gap closed.  A1c >15.5.  BS 33->296.  Continue Glargine 30 units sq BID.  ISS with coverage.  Accu-checks q AC & HS.  D/w Dr. Underwood.  Likely discharge home tomorrow.  F/u with Ophthalmology and Podiatry as outpatient.    #Acute hypokalemia.  K 3.1->3.8  Resolved.  KCl 40 mEq po x2 doses.  Check level in am.    # TASIA  Resolved.  Cr 2.11->1.03->0.81.    # Dyslipidemia.  On Atorvastatin.

## 2023-03-19 NOTE — PROGRESS NOTE ADULT - SUBJECTIVE AND OBJECTIVE BOX
Patient is a 33y old  Male who presents with a chief complaint of Nausea/Vomiting/Poor PO intake (18 Mar 2023 18:15)      Interval History: finger sticks are stable but in 250-300 range   C-Peptide 0.4 , hence declining   type 1.5 diabetes and patient is evolving into type 1 diabetes     MEDICATIONS  (STANDING):  atorvastatin 20 milliGRAM(s) Oral at bedtime  bacitracin   Ointment 1 Application(s) Topical daily  chlorhexidine 2% Cloths 1 Application(s) Topical daily  heparin   Injectable 7500 Unit(s) SubCutaneous every 8 hours  insulin glargine Injectable (LANTUS) 30 Unit(s) SubCutaneous every morning  insulin glargine Injectable (LANTUS) 30 Unit(s) SubCutaneous at bedtime  insulin lispro (ADMELOG) corrective regimen sliding scale   SubCutaneous Before meals and at bedtime  insulin lispro Injectable (ADMELOG) 12 Unit(s) SubCutaneous three times a day before meals    MEDICATIONS  (PRN):  dextrose Oral Gel 15 Gram(s) Oral once PRN Blood Glucose LESS THAN 70 milliGRAM(s)/deciliter  ondansetron Injectable 4 milliGRAM(s) IV Push every 6 hours PRN Nausea and/or Vomiting      Allergies    No Known Allergies    Intolerances        REVIEW OF SYSTEMS:  CONSTITUTIONAL: no changes  EYES: No eye pain, visual disturbances, or discharge  ENMT:  No difficulty hearing, No sinus or throat pain  NECK: No pain or stiffness  RESPIRATORY: No cough, wheezing, chills or hemoptysis; No shortness of breath  CARDIOVASCULAR: No chest pain, palpitations or leg swelling  GASTROINTESTINAL: No abdominal or epigastric pain. No nausea, vomiting, or hematemesis; No diarrhea or constipation. No melena or hematochezia.  GENITOURINARY: No dysuria, frequency, hematuria, or incontinence  NEUROLOGICAL: No headaches, memory loss, loss of strength, numbness, or tremors  SKIN: No itching, burning, rashes, or lesions   ENDOCRINE: No heat or cold intolerance; No hair loss  MUSCULOSKELETAL: No joint pain or swelling; No muscle, back, or extremity pain  PSYCHIATRIC: No depression, anxiety, mood swings, or difficulty sleeping  HEME/LYMPH: No easy bruising, or bleeding gums  ALLERY AND IMMUNOLOGIC: No hives or eczema    Vital Signs Last 24 Hrs  T(C): 36.7 (19 Mar 2023 10:53), Max: 36.7 (18 Mar 2023 17:33)  T(F): 98 (19 Mar 2023 10:53), Max: 98.1 (18 Mar 2023 17:33)  HR: 101 (19 Mar 2023 10:53) (82 - 101)  BP: 121/78 (19 Mar 2023 10:53) (121/78 - 126/78)  BP(mean): --  RR: 18 (19 Mar 2023 10:53) (18 - 18)  SpO2: 98% (19 Mar 2023 10:53) (98% - 100%)    Parameters below as of 19 Mar 2023 10:53  Patient On (Oxygen Delivery Method): room air        PHYSICAL EXAM:  GENERAL:   HEAD: Atraumatic, Normocephalic  EYES: PERRLA, conjunctiva and sclera clear  ENMT: No  exudates,; Moist mucous membranes,, No lesions  NECK: Supple, No JVD, Normal thyroid  NERVOUS SYSTEM:  Alert & Oriented,   CHEST/LUNG: Clear to auscultation bilaterally; No rales, rhonchi, wheezing, or rubs  HEART: Regular rate and rhythm; No murmurs, rubs, or gallops  ABDOMEN: Soft, Nontender, Nondistended; Bowel sounds present  EXTREMITIES:  2+ Peripheral Pulses, no edema  SKIN: No rashes or lesions    LABS:        CAPILLARY BLOOD GLUCOSE      POCT Blood Glucose.: 296 mg/dL (19 Mar 2023 11:55)  POCT Blood Glucose.: 283 mg/dL (19 Mar 2023 07:28)  POCT Blood Glucose.: 348 mg/dL (18 Mar 2023 21:16)  POCT Blood Glucose.: 260 mg/dL (18 Mar 2023 16:55)    Lipid panel:           Thyroid:  Diabetes Tests:  Parathyroid Panel:  Adrenals:  RADIOLOGY & ADDITIONAL TESTS:    Imaging Personally Reviewed:  [ ] YES  [ ] NO    Consultant(s) Notes Reviewed:  [ ] YES  [ ] NO    Care Discussed with Consultants/Other Providers [ ] YES  [ ] NO

## 2023-03-20 VITALS
TEMPERATURE: 99 F | DIASTOLIC BLOOD PRESSURE: 82 MMHG | OXYGEN SATURATION: 99 % | RESPIRATION RATE: 18 BRPM | SYSTOLIC BLOOD PRESSURE: 120 MMHG | HEART RATE: 96 BPM

## 2023-03-20 LAB
CULTURE RESULTS: SIGNIFICANT CHANGE UP
CULTURE RESULTS: SIGNIFICANT CHANGE UP
GLUCOSE BLDC GLUCOMTR-MCNC: 298 MG/DL — HIGH (ref 70–99)
GLUCOSE BLDC GLUCOMTR-MCNC: 384 MG/DL — HIGH (ref 70–99)
SPECIMEN SOURCE: SIGNIFICANT CHANGE UP
SPECIMEN SOURCE: SIGNIFICANT CHANGE UP

## 2023-03-20 PROCEDURE — 99239 HOSP IP/OBS DSCHRG MGMT >30: CPT

## 2023-03-20 RX ORDER — ISOPROPYL ALCOHOL, BENZOCAINE .7; .06 ML/ML; ML/ML
1 SWAB TOPICAL
Qty: 100 | Refills: 1
Start: 2023-03-20 | End: 2023-05-08

## 2023-03-20 RX ORDER — ATORVASTATIN CALCIUM 80 MG/1
1 TABLET, FILM COATED ORAL
Qty: 0 | Refills: 0 | DISCHARGE

## 2023-03-20 RX ORDER — INSULIN GLARGINE 100 [IU]/ML
30 INJECTION, SOLUTION SUBCUTANEOUS
Qty: 2 | Refills: 0
Start: 2023-03-20 | End: 2023-04-18

## 2023-03-20 RX ORDER — INSULIN LISPRO 100/ML
10 VIAL (ML) SUBCUTANEOUS
Qty: 1 | Refills: 0
Start: 2023-03-20

## 2023-03-20 RX ORDER — ATORVASTATIN CALCIUM 80 MG/1
1 TABLET, FILM COATED ORAL
Qty: 30 | Refills: 0
Start: 2023-03-20 | End: 2023-04-18

## 2023-03-20 RX ORDER — INSULIN ASPART 100 [IU]/ML
10 INJECTION, SOLUTION SUBCUTANEOUS
Qty: 1 | Refills: 0
Start: 2023-03-20

## 2023-03-20 RX ORDER — SENNA PLUS 8.6 MG/1
2 TABLET ORAL AT BEDTIME
Refills: 0 | Status: DISCONTINUED | OUTPATIENT
Start: 2023-03-20 | End: 2023-03-20

## 2023-03-20 RX ORDER — INSULIN LISPRO 100/ML
10 VIAL (ML) SUBCUTANEOUS
Qty: 0 | Refills: 0 | DISCHARGE
Start: 2023-03-20

## 2023-03-20 RX ORDER — INSULIN GLARGINE 100 [IU]/ML
30 INJECTION, SOLUTION SUBCUTANEOUS
Qty: 1 | Refills: 0
Start: 2023-03-20

## 2023-03-20 RX ORDER — METFORMIN HYDROCHLORIDE 850 MG/1
1 TABLET ORAL
Qty: 0 | Refills: 0 | DISCHARGE

## 2023-03-20 RX ORDER — POLYETHYLENE GLYCOL 3350 17 G/17G
17 POWDER, FOR SOLUTION ORAL EVERY 24 HOURS
Refills: 0 | Status: DISCONTINUED | OUTPATIENT
Start: 2023-03-20 | End: 2023-03-20

## 2023-03-20 RX ORDER — METFORMIN HYDROCHLORIDE 850 MG/1
1 TABLET ORAL
Qty: 60 | Refills: 0
Start: 2023-03-20 | End: 2023-04-18

## 2023-03-20 RX ADMIN — Medication 10: at 11:35

## 2023-03-20 RX ADMIN — Medication 1 APPLICATION(S): at 11:30

## 2023-03-20 RX ADMIN — CHLORHEXIDINE GLUCONATE 1 APPLICATION(S): 213 SOLUTION TOPICAL at 11:34

## 2023-03-20 RX ADMIN — Medication 6: at 07:51

## 2023-03-20 RX ADMIN — INSULIN GLARGINE 30 UNIT(S): 100 INJECTION, SOLUTION SUBCUTANEOUS at 07:56

## 2023-03-20 RX ADMIN — Medication 12 UNIT(S): at 12:12

## 2023-03-20 RX ADMIN — SENNA PLUS 2 TABLET(S): 8.6 TABLET ORAL at 00:33

## 2023-03-20 RX ADMIN — Medication 12 UNIT(S): at 07:54

## 2023-03-20 NOTE — PROGRESS NOTE ADULT - PROBLEM SELECTOR PLAN 1
Continue with the current  regimen while inpatient   Check C-Peptide and if sufficient may try Insulin sensitizers   otherwise may need more insulin
Patient can be discharged current regimen of insulin with 500 mg of metformin.  As an outpatient similar or GLP-1 can be tried.  GLP-1 agonist may not be that effective.  Patient needs to lose weight in order to decrease his insulin resistance.  Insulin may be requiring very high doses to overcome his insulin resistance and gave him a reasonable control of blood glucose.  At that level the insulin may be more atherogenic.  Thus patient may be on Symlin and weight loss which will be of much help to him
Although type 1.5 diabetic , can be on Metformin 500 mg QD to help decrease Insulin Resistance and help decrease glucose toxicity   while inpatient, finger sticks should be 100-180
Continue with the current  regimen while inpatient   as out-patient Check RJA 65 antibodies   as mentioned above he may be in honeymoon phase of type 1 diabetes and currently type 1.5
Check C-Peptide and if sufficient introduce Insulin sensitizers   if low , then will need increased insulin   Nutrition increased

## 2023-03-20 NOTE — DISCHARGE NOTE PROVIDER - NSDCCPCAREPLAN_GEN_ALL_CORE_FT
PRINCIPAL DISCHARGE DIAGNOSIS  Diagnosis: DKA (diabetic ketoacidosis)  Assessment and Plan of Treatment:       SECONDARY DISCHARGE DIAGNOSES  Diagnosis: Type 2 diabetes mellitus  Assessment and Plan of Treatment:     Diagnosis: Dyslipidemia  Assessment and Plan of Treatment:     Diagnosis: Obesity  Assessment and Plan of Treatment:     Diagnosis: TASIA (acute kidney injury)  Assessment and Plan of Treatment:     Diagnosis: Hypokalemia  Assessment and Plan of Treatment:

## 2023-03-20 NOTE — PROGRESS NOTE ADULT - REASON FOR ADMISSION
Nausea/Vomiting/Poor PO intake

## 2023-03-20 NOTE — DISCHARGE NOTE NURSING/CASE MANAGEMENT/SOCIAL WORK - NSDCPEFALRISK_GEN_ALL_CORE
For information on Fall & Injury Prevention, visit: [email or web address] OR  [email or web address] OR  https://www.cdc.gov/steadi/patient.html

## 2023-03-20 NOTE — PROGRESS NOTE ADULT - PROVIDER SPECIALTY LIST ADULT
Critical Care
Endocrinology
Critical Care
Endocrinology
Endocrinology
Hospitalist
Hospitalist
Endocrinology
Endocrinology

## 2023-03-20 NOTE — DISCHARGE NOTE PROVIDER - HOSPITAL COURSE
32 yo male with DM2 (on metformin), HLD presented with feeling fatigue, frequent urination, abdominal pain N/V, and difficulty breathing progressively getting worse for 1 month.   Pt states unable to tolerate PO with vomiting. Pt reports not being able to take his DM medications for the past week.   Admitted to ICU with DKA. Off insulin drip, Anion gap closed.  Transferred out of ICU to floor on 03/17/2023.      # DKA with DM-2->1.5.  DKA resolved. Anion gap closed.  A1c >15.5.  BS 33->296.  Continue Glargine 30 units sq BID.  Lispro 10 units 3 times daily with meals.  D/w Dr. Underwood, cleared for discharge with above meds.  Has his appointment with his PCP tomorrow.  F/u with Dr Underwood in 1-2 weeks.  F/u with Ophthalmology and Podiatry as outpatient in co-ordination with PCP.  Excuse letter given to return to work on Thursday (03/23/203).  D/c home today with home health/visiting nurse.  CM/SW aware.    #Acute hypokalemia.  K 3.1->3.8  Resolved.  KCl 40 mEq po x2 doses.  Check level in am.    # TASIA  Resolved.  Cr 2.11->1.03->0.81.    # Dyslipidemia.  On Atorvastatin.

## 2023-03-20 NOTE — DISCHARGE NOTE PROVIDER - CARE PROVIDER_API CALL
PCP tomorrow.,   Phone: (   )    -  Fax: (   )    -  Follow Up Time:     Ayaz Underwood)  EndocrinologyMetabDiabetes  [address], [address]  [place] [address]  Phone: [phone]  Fax: [phone]  Follow Up Time:

## 2023-03-20 NOTE — DISCHARGE NOTE NURSING/CASE MANAGEMENT/SOCIAL WORK - PATIENT PORTAL LINK FT
You can access the FollowMyHealth Patient Portal offered by [facility] by registering at the following website: [email or web address]. By joining [employer]’s FollowMyHealth portal, you will also be able to view your health information using other applications (apps) compatible with our system.

## 2023-03-20 NOTE — CHART NOTE - NSCHARTNOTEFT_GEN_A_CORE
SICK LETTER  To Whom It May Concern,   	Please excuse Mr.Brandon Gomez, :1990; as he was sick from 3/15/23 to 3/20/23. Patient was admitted and treated in [facility]. Patient may return to work on 3/23/23.  Any further questions or concerns please use contact info below.  Nasreen Perez NP  Internal Medicine  [address] [place] [address] [phone]

## 2023-03-20 NOTE — PROGRESS NOTE ADULT - SUBJECTIVE AND OBJECTIVE BOX
Patient is a 33y old  Male who presents with a chief complaint of Nausea/Vomiting/Poor PO intake (20 Mar 2023 08:24)      Interval History: Patient being discharged today.  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  Fingersticks are still in the 300s.  Being started on 500 mg of metformin daily which can be given for a few weeks to months post discharge    MEDICATIONS  (STANDING):  atorvastatin 20 milliGRAM(s) Oral at bedtime  bacitracin   Ointment 1 Application(s) Topical daily  chlorhexidine 2% Cloths 1 Application(s) Topical daily  heparin   Injectable 7500 Unit(s) SubCutaneous every 8 hours  insulin glargine Injectable (LANTUS) 30 Unit(s) SubCutaneous every morning  insulin glargine Injectable (LANTUS) 30 Unit(s) SubCutaneous at bedtime  insulin lispro (ADMELOG) corrective regimen sliding scale   SubCutaneous Before meals and at bedtime  insulin lispro Injectable (ADMELOG) 12 Unit(s) SubCutaneous three times a day before meals  senna 2 Tablet(s) Oral at bedtime    MEDICATIONS  (PRN):  dextrose Oral Gel 15 Gram(s) Oral once PRN Blood Glucose LESS THAN 70 milliGRAM(s)/deciliter  ondansetron Injectable 4 milliGRAM(s) IV Push every 6 hours PRN Nausea and/or Vomiting  polyethylene glycol 3350 17 Gram(s) Oral every 24 hours PRN Constipation      Allergies    No Known Allergies    Intolerances        REVIEW OF SYSTEMS:  CONSTITUTIONAL: no changes  EYES: No eye pain, visual disturbances, or discharge  ENMT:  No difficulty hearing, No sinus or throat pain  NECK: No pain or stiffness  RESPIRATORY: No cough, wheezing, chills or hemoptysis; No shortness of breath  CARDIOVASCULAR: No chest pain, palpitations or leg swelling  GASTROINTESTINAL: No abdominal or epigastric pain. No nausea, vomiting, or hematemesis; No diarrhea or constipation. No melena or hematochezia.  GENITOURINARY: No dysuria, frequency, hematuria, or incontinence  NEUROLOGICAL: No headaches, memory loss, loss of strength, numbness, or tremors  SKIN: No itching, burning, rashes, or lesions   ENDOCRINE: No heat or cold intolerance; No hair loss  MUSCULOSKELETAL: No joint pain or swelling; No muscle, back, or extremity pain  PSYCHIATRIC: No depression, anxiety, mood swings, or difficulty sleeping  HEME/LYMPH: No easy bruising, or bleeding gums  ALLERY AND IMMUNOLOGIC: No hives or eczema    Vital Signs Last 24 Hrs  T(C): 37 (20 Mar 2023 11:09), Max: 37.1 (20 Mar 2023 10:27)  T(F): 98.6 (20 Mar 2023 11:09), Max: 98.7 (20 Mar 2023 10:27)  HR: 96 (20 Mar 2023 11:09) (90 - 96)  BP: 120/82 (20 Mar 2023 11:09) (116/76 - 139/81)  BP(mean): --  RR: 18 (20 Mar 2023 11:09) (18 - 18)  SpO2: 99% (20 Mar 2023 11:09) (97% - 99%)    Parameters below as of 20 Mar 2023 11:09  Patient On (Oxygen Delivery Method): room air        PHYSICAL EXAM:  GENERAL:   HEAD: Atraumatic, Normocephalic  EYES: PERRLA, conjunctiva and sclera clear  ENMT: No  exudates,; Moist mucous membranes,, No lesions  NECK: Supple, No JVD, Normal thyroid  NERVOUS SYSTEM:  Alert & Oriented,   CHEST/LUNG: Clear to auscultation bilaterally; No rales, rhonchi, wheezing, or rubs  HEART: Regular rate and rhythm; No murmurs, rubs, or gallops  ABDOMEN: Soft, Nontender, Nondistended; Bowel sounds present  EXTREMITIES:  2+ Peripheral Pulses, no edema  SKIN: No rashes or lesions    LABS:        CAPILLARY BLOOD GLUCOSE      POCT Blood Glucose.: 384 mg/dL (20 Mar 2023 11:01)  POCT Blood Glucose.: 298 mg/dL (20 Mar 2023 07:32)    Lipid panel:           Thyroid:  Diabetes Tests:  Parathyroid Panel:  Adrenals:  RADIOLOGY & ADDITIONAL TESTS:    Imaging Personally Reviewed:  [ ] YES  [ ] NO    Consultant(s) Notes Reviewed:  [ ] YES  [ ] NO    Care Discussed with Consultants/Other Providers [ ] YES  [ ] NO

## 2023-03-20 NOTE — DISCHARGE NOTE PROVIDER - NSDCMRMEDTOKEN_GEN_ALL_CORE_FT
atorvastatin 20 mg oral tablet: 1 tab(s) orally once a day  lancets: 1 application subcutaneously 4 times a day   Lantus Solostar Pen 100 units/mL subcutaneous solution: 30 unit(s) subcutaneous 2 times a day   metFORMIN 500 mg oral tablet: 1 tab(s) orally 2 times a day   alcohol swabs : Apply topically to affected area 4 times a day   atorvastatin 20 mg oral tablet: 1 tab(s) orally once a day  glucometer (per patient&#x27;s insurance): Test blood sugars four times a day. Dispense #1 glucometer.  insulin lispro 100 units/mL injectable solution: 10 unit(s) injectable 3 times a day with each meals.  Insulin Pen Needles, 4mm: 1 application subcutaneously 4 times a day. ** Use with insulin pen **   lancets: 1 application subcutaneously 4 times a day   Lantus Solostar Pen 100 units/mL subcutaneous solution: 30 unit(s) subcutaneous 2 times a day   metFORMIN 500 mg oral tablet: 1 tab(s) orally 2 times a day  test strips (per patient&#x27;s insurance): 1 application subcutaneously 4 times a day. ** Compatible with patient&#x27;s glucometer **   alcohol swabs : Apply topically to affected area 4 times a day   atorvastatin 20 mg oral tablet: 1 tab(s) orally once a day  Basaglar KwikPen 100 units/mL subcutaneous solution: 30 unit(s) subcutaneous 2 times a day   glucometer (per patient&#x27;s insurance): Test blood sugars four times a day. Dispense #1 glucometer.  Insulin Pen Needles, 4mm: 1 application subcutaneously 4 times a day. ** Use with insulin pen **   lancets: 1 application subcutaneously 4 times a day   metFORMIN 500 mg oral tablet: 1 tab(s) orally 2 times a day  NovoLOG 100 units/mL injectable solution: 10 unit(s) injectable 3 times a day (with meals)   test strips (per patient&#x27;s insurance): 1 application subcutaneously 4 times a day. ** Compatible with patient&#x27;s glucometer **

## 2023-03-23 DIAGNOSIS — E66.9 OBESITY, UNSPECIFIED: ICD-10-CM

## 2023-03-23 DIAGNOSIS — Z20.822 CONTACT WITH AND (SUSPECTED) EXPOSURE TO COVID-19: ICD-10-CM

## 2023-03-23 DIAGNOSIS — E87.6 HYPOKALEMIA: ICD-10-CM

## 2023-03-23 DIAGNOSIS — E78.5 HYPERLIPIDEMIA, UNSPECIFIED: ICD-10-CM

## 2023-03-23 DIAGNOSIS — R53.83 OTHER FATIGUE: ICD-10-CM

## 2023-03-23 DIAGNOSIS — E86.0 DEHYDRATION: ICD-10-CM

## 2023-03-23 DIAGNOSIS — Z79.84 LONG TERM (CURRENT) USE OF ORAL HYPOGLYCEMIC DRUGS: ICD-10-CM

## 2023-03-23 DIAGNOSIS — E11.10 TYPE 2 DIABETES MELLITUS WITH KETOACIDOSIS WITHOUT COMA: ICD-10-CM

## 2023-03-23 DIAGNOSIS — N17.9 ACUTE KIDNEY FAILURE, UNSPECIFIED: ICD-10-CM
